# Patient Record
Sex: MALE | Race: WHITE | NOT HISPANIC OR LATINO | Employment: FULL TIME | URBAN - METROPOLITAN AREA
[De-identification: names, ages, dates, MRNs, and addresses within clinical notes are randomized per-mention and may not be internally consistent; named-entity substitution may affect disease eponyms.]

---

## 2023-06-09 ENCOUNTER — OCCUPATIONAL HEALTH (OUTPATIENT)
Dept: URGENT CARE | Facility: CLINIC | Age: 38
End: 2023-06-09
Payer: COMMERCIAL

## 2023-10-19 ENCOUNTER — HOSPITAL ENCOUNTER (OUTPATIENT)
Dept: RADIOLOGY | Facility: HOSPITAL | Age: 38
Discharge: HOME | End: 2023-10-19
Attending: STUDENT IN AN ORGANIZED HEALTH CARE EDUCATION/TRAINING PROGRAM
Payer: COMMERCIAL

## 2023-10-19 ENCOUNTER — OFFICE VISIT (OUTPATIENT)
Dept: PAIN MEDICINE | Facility: CLINIC | Age: 38
End: 2023-10-19
Payer: COMMERCIAL

## 2023-10-19 VITALS — HEART RATE: 85 BPM | SYSTOLIC BLOOD PRESSURE: 134 MMHG | DIASTOLIC BLOOD PRESSURE: 82 MMHG | HEIGHT: 72 IN

## 2023-10-19 DIAGNOSIS — M47.816 LUMBAR SPONDYLOSIS: ICD-10-CM

## 2023-10-19 DIAGNOSIS — M54.9 BACK PAIN, UNSPECIFIED BACK LOCATION, UNSPECIFIED BACK PAIN LATERALITY, UNSPECIFIED CHRONICITY: ICD-10-CM

## 2023-10-19 DIAGNOSIS — M54.9 BACK PAIN, UNSPECIFIED BACK LOCATION, UNSPECIFIED BACK PAIN LATERALITY, UNSPECIFIED CHRONICITY: Primary | ICD-10-CM

## 2023-10-19 PROCEDURE — 99203 PR OFFICE/OUTPT VISIT, NEW, LEVL III, 30-44 MIN: ICD-10-PCS | Mod: S$GLB,,, | Performed by: STUDENT IN AN ORGANIZED HEALTH CARE EDUCATION/TRAINING PROGRAM

## 2023-10-19 PROCEDURE — 1160F RVW MEDS BY RX/DR IN RCRD: CPT | Mod: CPTII,S$GLB,, | Performed by: STUDENT IN AN ORGANIZED HEALTH CARE EDUCATION/TRAINING PROGRAM

## 2023-10-19 PROCEDURE — 1160F PR REVIEW ALL MEDS BY PRESCRIBER/CLIN PHARMACIST DOCUMENTED: ICD-10-PCS | Mod: CPTII,S$GLB,, | Performed by: STUDENT IN AN ORGANIZED HEALTH CARE EDUCATION/TRAINING PROGRAM

## 2023-10-19 PROCEDURE — 99999 PR PBB SHADOW E&M-EST. PATIENT-LVL III: ICD-10-PCS | Mod: PBBFAC,,, | Performed by: STUDENT IN AN ORGANIZED HEALTH CARE EDUCATION/TRAINING PROGRAM

## 2023-10-19 PROCEDURE — 72100 X-RAY EXAM L-S SPINE 2/3 VWS: CPT | Mod: TC

## 2023-10-19 PROCEDURE — 1159F PR MEDICATION LIST DOCUMENTED IN MEDICAL RECORD: ICD-10-PCS | Mod: CPTII,S$GLB,, | Performed by: STUDENT IN AN ORGANIZED HEALTH CARE EDUCATION/TRAINING PROGRAM

## 2023-10-19 PROCEDURE — 3079F DIAST BP 80-89 MM HG: CPT | Mod: CPTII,S$GLB,, | Performed by: STUDENT IN AN ORGANIZED HEALTH CARE EDUCATION/TRAINING PROGRAM

## 2023-10-19 PROCEDURE — 3079F PR MOST RECENT DIASTOLIC BLOOD PRESSURE 80-89 MM HG: ICD-10-PCS | Mod: CPTII,S$GLB,, | Performed by: STUDENT IN AN ORGANIZED HEALTH CARE EDUCATION/TRAINING PROGRAM

## 2023-10-19 PROCEDURE — 99999 PR PBB SHADOW E&M-EST. PATIENT-LVL III: CPT | Mod: PBBFAC,,, | Performed by: STUDENT IN AN ORGANIZED HEALTH CARE EDUCATION/TRAINING PROGRAM

## 2023-10-19 PROCEDURE — 72100 X-RAY EXAM L-S SPINE 2/3 VWS: CPT | Mod: 26,,, | Performed by: RADIOLOGY

## 2023-10-19 PROCEDURE — 3075F PR MOST RECENT SYSTOLIC BLOOD PRESS GE 130-139MM HG: ICD-10-PCS | Mod: CPTII,S$GLB,, | Performed by: STUDENT IN AN ORGANIZED HEALTH CARE EDUCATION/TRAINING PROGRAM

## 2023-10-19 PROCEDURE — 99203 OFFICE O/P NEW LOW 30 MIN: CPT | Mod: S$GLB,,, | Performed by: STUDENT IN AN ORGANIZED HEALTH CARE EDUCATION/TRAINING PROGRAM

## 2023-10-19 PROCEDURE — 4010F ACE/ARB THERAPY RXD/TAKEN: CPT | Mod: CPTII,S$GLB,, | Performed by: STUDENT IN AN ORGANIZED HEALTH CARE EDUCATION/TRAINING PROGRAM

## 2023-10-19 PROCEDURE — 1159F MED LIST DOCD IN RCRD: CPT | Mod: CPTII,S$GLB,, | Performed by: STUDENT IN AN ORGANIZED HEALTH CARE EDUCATION/TRAINING PROGRAM

## 2023-10-19 PROCEDURE — 3075F SYST BP GE 130 - 139MM HG: CPT | Mod: CPTII,S$GLB,, | Performed by: STUDENT IN AN ORGANIZED HEALTH CARE EDUCATION/TRAINING PROGRAM

## 2023-10-19 PROCEDURE — 4010F PR ACE/ARB THEARPY RXD/TAKEN: ICD-10-PCS | Mod: CPTII,S$GLB,, | Performed by: STUDENT IN AN ORGANIZED HEALTH CARE EDUCATION/TRAINING PROGRAM

## 2023-10-19 PROCEDURE — 72100 XR LUMBAR SPINE AP AND LATERAL: ICD-10-PCS | Mod: 26,,, | Performed by: RADIOLOGY

## 2023-10-19 NOTE — PROGRESS NOTES
Office Note    No, Primary Doctor      First Office Visit: 10/19/23  Today' Date: 10/19/2023  Last Office Visit: None    Chief complaint: back pain    HPI: Pt is a pleasent 37 y.o., who presents for evaluation. Referred by self. Pt complains of back pain for years. Pain stays primarily in the lower back. In the past he has had sharp, shooting pain going down the leg to the top of the foot. He was seen at [facility] in the past and an outside pain clinic where he has had ESIs. Back pain is worse with sitting. Has not tried PT and is open to trying. No BB changes.     Pain score: 4  Pain disability score: 28      Relevant Imaging/ Testing: None    Procedures: None    Date of board of pharmacy review:10/19/2023  Date of opioid risk screening/ pain psych: None  Date of opioid agreement and consent: None  Date of urine drug screen: None  Date of random pill count: None     was reviewed today: reviewed, no concerns     Prescribed medications: None    See EHR for  PMH, PSH, FH, SH, Medications and Allergy    ROS:  Positive for pain  ROS     PE:  Vitals:    10/19/23 1248   BP: 134/82   Pulse: 85     General: Pleasant, no distress  HEENT: NC/ AT. PERRLA  CV: Radial pulses intact  Pulm: No distress  Ext: No edema    Physical Exam     Neuromusculoskeletal:  Head: NC, AT. PERRLA.  Neck: Intact range of motions  Shoulder: Intact range of motion  Lumbar: Intact range of motion. Pain with extension. Pos Facet loading bilaterally. Min Tenderness. Neg SL   Hip: Intact range of motion  SI: Level, min tenderness  Knee: Intact range of motion  Reflexes: normal Knee  Strength: 5/5 globally   Sensory: Grossly intact   Skin: No bruising, erythema  Gait: Normal      Impression:  Back pain  Axial back pain  Relevant History  None         Plan:  Discussed options  Imaging/ relevant records viewed/ reviewed/ discussed  Imaging results viewed and reviewed (noted above)/ reviewed with patient   reviewed  PT trial  XR L-spine  Re-eval after  MR  L-spine if pain persists  B MBB L4-5 and L5-S1 if pain persists        Prescribed medications:  1. None    The impression and plan were discussed and explained in detail. All the questions were answered. Education was provided accordingly.         Follow-up:  6 wks or sooner if needed    Gaviota Hoffman MD

## 2023-11-03 ENCOUNTER — CLINICAL SUPPORT (OUTPATIENT)
Dept: REHABILITATION | Facility: HOSPITAL | Age: 38
End: 2023-11-03
Attending: STUDENT IN AN ORGANIZED HEALTH CARE EDUCATION/TRAINING PROGRAM
Payer: COMMERCIAL

## 2023-11-03 DIAGNOSIS — M54.9 BACK PAIN, UNSPECIFIED BACK LOCATION, UNSPECIFIED BACK PAIN LATERALITY, UNSPECIFIED CHRONICITY: ICD-10-CM

## 2023-11-03 DIAGNOSIS — M47.816 LUMBAR SPONDYLOSIS: ICD-10-CM

## 2023-11-03 DIAGNOSIS — R29.898 WEAKNESS OF BOTH HIPS: ICD-10-CM

## 2023-11-03 DIAGNOSIS — M53.86 DECREASED ROM OF LUMBAR SPINE: ICD-10-CM

## 2023-11-03 PROCEDURE — 97161 PT EVAL LOW COMPLEX 20 MIN: CPT | Mod: PN

## 2023-11-03 PROCEDURE — 97530 THERAPEUTIC ACTIVITIES: CPT | Mod: PN

## 2023-11-05 NOTE — PLAN OF CARE
[facility]   Physical Therapy Initial Evaluation      Name: Cole Mendes  [facility] Number: [id number]    Therapy Diagnosis:   Encounter Diagnoses   Name Primary?    Back pain, unspecified back location, unspecified back pain laterality, unspecified chronicity     Lumbar spondylosis     Decreased ROM of lumbar spine     Weakness of both hips         Physician: Gaviota Hoffman, MD    Physician Orders: PT Eval and Treat   Medical Diagnosis from Referral:   M54.9 (ICD-10-CM) - Back pain, unspecified back location, unspecified back pain laterality, unspecified chronicity   M47.816 (ICD-10-CM) - Lumbar spondylosis     Evaluation Date: 11/3/2023  Authorization Period Expiration: 12/31/2023  Plan of Care Expiration: 12/29/2023  Progress Note Due: 12/04/2023  Date of Surgery: N/A  Visit # / Visits authorized: 1/ 1   FOTO: 1/ 3    Precautions: Standard     Time In: 10:08  Time Out: 11:01  Total Billable Time: 57 minutes    Subjective     Date of onset: 10 years at time; a couple weeks ago and he had ane xacerbation     History of current condition - [place] reports: He has had back pain for an extended period of time; while he was in [place] he was having pain and went to his MD and they were giving himinjections for an extended period of time; he eventually got an MRI and it was noted that he had [degenerative] changes and no disc bulges. He got a second opinion and the conclusion was the same. His insurance was giving him issues so he did not get therapy, but since moving to [place] 6 months ago he is prepared to start therapy. He mainly notices the pain with prolonged sitting and following any type of strenuous activities. His line of work makes things a little more strenuous for his low back.    Falls: none     Imaging: X-Ray: Lumbar vertebral bodies maintain normal height without evidence of fracture.  Grade 1 anterolisthesis of L4 on L5.  Moderate intervertebral disc space narrowing at L5-S1 and mild  disc space narrowing at L4-5.  Lower lumbar facet arthropathy, most pronounced at L4-5.  No focal soft tissue abnormality.    Prior Therapy: None   Social History:  lives with their spouse  Occupation:  for [employer]   Prior Level of Function: Indepedent with all task, active and is looking to get gym   Current Level of Function: indepdnet with all task and still active, just limited with pain     Pain:  Current 4/10, worst 9/10, best 3/10   Location: bilateral Lumbosacral junction    Description: Aching, Throbbing, and stabbing  Aggravating Factors: Sitting, Morning, and Extension  Easing Factors: rest and heating pad    Patients goals: I would like to be able to use the physical therapy to find a method to strengthen my muscles group so that I don't need to take antiinflammatory      Medical History:   No past medical history on file.    Surgical History:   Cole Mendes  has no past surgical history on file.    Medications:   Cole currently has no medications in their medication list.    Allergies:   Review of patient's allergies indicates:  No Known Allergies     Objective      Observation: excess lordosis, hinging from Lumbosacral joint and TL junction    Posture:  bilateral hip drop, increased rotation from trunk, limited hip extension     Dermatomes Right Left Comments   L2 Intact Intact     L3 Intact Intact     L4 Intact Intact     L5 Intact Intact     S1 Intact Intact     S2 Intact Intact           Myotomes Right Left Comments   L2 5/5 5/5     L3 5/5 5/5     L4 5/5 5/5     L5 5/5 5/5     S1 5/5 5/5     S2 5/5 5/5       Lumbar Range of Motion:    Limitations Pain Normal   Flexion 50   Y      40-50 degrees   Extension 50   Y      20 degrees   Left Side Bending 50 Y      20 degrees   Right Side Bending 50 Y      20 degrees    Almost 75% limited: no movement from lumbar spine     Hip Passive Range of Motion:    Right  Left  Normal   Flexion 95 95 120 degrees    Extension 20 20 20 degrees    Ext.  Rotation 45 45 45-60 degrees   Int. Rotation 35 35 35 degrees        Lower Extremity Strength  Right LE  Left LE    Quadriceps: 5/5 Quadriceps: 5/5   Hamstrings: 5/5 Hamstrings: 5/5   Iliospoas: 4+/5 Iliospoas: 4+/5   Hip extension:  3+/5 Hip extension: 3+/5   Hip abduction: 4-/5 Hip abduction: 4-/5   Hip ER:  4+/5 Hip ER: 4+/5   Hip IR: 4/5 Hip IR: 4/5   Ankle dorsiflexion: 5/5 Ankle dorsiflexion: 5/5   Ankle plantarflexion: 5/5 Ankle plantarflexion: 5/5     Sensation: intact to light touch in BLE     Reflexes:  -Patellar (L3-L4): 2+  -Achilles (S1): 2+    Special Tests:  -SLUMP Test: inconclusive   -SLR Test: inconclusive       SI Special Tests:   Distraction: -  Compression: -  Sacral thrust: -    Joint Mobility: Decreased Lumbar (L1-L5) mobility noted with PA assessment     Palpation: No tenderness to palpation noted in the thoracic or lumbar spine               Treatment     Total Treatment time (time-based codes) separate from Evaluation: 15 minutes     Cole received the treatments listed below:      therapeutic activities to improve functional performance for 15  minutes, including:  HEP education:  Hand heel rocks x15, 3 second hold   Clamshells x5, with red theraband, 5 second hold   Deadbugs x10    POC education        Patient Education and Home Exercises     Education provided:   - HEP education  - POC education    Written Home Exercises Provided: yes. Exercises were reviewed and Cole was able to demonstrate them prior to the end of the session.  Cole demonstrated good  understanding of the education provided. See EMR under Patient Instructions for exercises provided during therapy sessions.    Assessment     Cole is a 38 y.o. male referred to outpatient Physical Therapy with a medical diagnosis of M54.9 (ICD-10-CM) - Back pain, unspecified back location, unspecified back pain laterality, unspecified chronicity and M47.816 (ICD-10-CM) - Lumbar spondylosis. Patient presents with signs and  symptoms consistent with lumbar facet syndrome and chronic low back pain with mobility deficits. Joseph has decreased lumbar spine range of motion and excess lumbar lordosis; this is leading him to hinge movements from his thoracolumbar junction and lumbosacral junction. In addition Cole has global hip weakness; his lack of lumbar mobility and decrease hip stability is causing abnormal loading patterns and increase load to be placed through his lumbar spine. These deficits are causing deficits with his ADLs, self care, work related task and ability to participate in his leisurely activities. He makes a good candidate for skilled Physical Therapy to improve the above listed deficits.     Patient prognosis is Good.   Patient will benefit from skilled outpatient Physical Therapy to address the deficits stated above and in the chart below, provide patient /family education, and to maximize patientt's level of independence.     Plan of care discussed with patient: Yes  Patient's spiritual, cultural and educational needs considered and patient is agreeable to the plan of care and goals as stated below:     Anticipated Barriers for therapy: none noted    Medical Necessity is demonstrated by the following  History  Co-morbidities and personal factors that may impact the plan of care [x] LOW: no personal factors / co-morbidities  [] MODERATE: 1-2 personal factors / co-morbidities  [] HIGH: 3+ personal factors / co-morbidities    Moderate / High Support Documentation:   Co-morbidities affecting plan of care:     Personal Factors:   no deficits     Examination  Body Structures and Functions, activity limitations and participation restrictions that may impact the plan of care [x] LOW: addressing 1-2 elements  [] MODERATE: 3+ elements  [] HIGH: 4+ elements (please support below)    Moderate / High Support Documentation:      Clinical Presentation [x] LOW: stable  [] MODERATE: Evolving  [] HIGH: Unstable     Decision Making/  Complexity Score: low       Goals:  Short Term Goals: 4 weeks. Pt agrees with goals set.  Pt will demonstrate independence and compliance with initial HEP to improve independence and symptom management.   Pt will report lumbar spine pain </= 3/10 at rest with sitting, standing and laying down to demonstrate improved condition and ability to participate in his leisurely activities.    Pt will improve MMT of hip extensors to >/= 4-/5 to improve tolerance for standing, bending and lifting; to also decrease the demand placed on his lumbar spine.    Pt will improve lumbar ROM by >= 25 % to improve tolerance for bending and lifting to improve ability to complete ADLs and work related task.      Long Term Goals: 8 weeks. Pt agrees with goals set.   Pt will demonstrate independence and compliance with final HEP to continue managing symptoms and developing mobility, motor control and strength.    Pt will improve FOTO score to </= 33% limited to demonstrate improved functional mobility.    Pt will report lumbar spine pain </= 2/10 on exertion  with bending and lifting to demonstrate improved condition and ability to complete work related task and participate in higher level activities.    Pt will improve MMT of hip abductors and hip extensors to >/= 4+/5 to improve hip stability and decrease the demand placed on the lumbar spine; improving his ability to lift objects at work.    Pt will improve lumbar ROM by >/= 50% to improve tolerance for flexion, extension, and side bending to improve his ability to complete lifting at work and facilitate a return to his ADLs and leisurely activities.    Pt goal:  I would like to be able to use the physical therapy to find a method to strengthen my muscles group so that I don't need to take antiinflammatory    Plan     Plan of care Certification: 11/3/2023 to 12/31/2023.    Outpatient Physical Therapy 2 times weekly for 8 weeks to include the following interventions: Cervical/Lumbar  Traction, Electrical Stimulation NMES, Gait Training, Manual Therapy, Moist Heat/ Ice, Neuromuscular Re-ed, Patient Education, Self Care, Therapeutic Activities, and Therapeutic Exercise.     Jared Quintana PT, DPT        Physician's Signature: _________________________________________ Date: ________________

## 2023-11-09 ENCOUNTER — CLINICAL SUPPORT (OUTPATIENT)
Dept: REHABILITATION | Facility: HOSPITAL | Age: 38
End: 2023-11-09
Payer: COMMERCIAL

## 2023-11-09 DIAGNOSIS — M53.86 DECREASED ROM OF LUMBAR SPINE: Primary | ICD-10-CM

## 2023-11-09 DIAGNOSIS — R29.898 WEAKNESS OF BOTH HIPS: ICD-10-CM

## 2023-11-09 PROCEDURE — 97112 NEUROMUSCULAR REEDUCATION: CPT | Mod: PN

## 2023-11-09 PROCEDURE — 97110 THERAPEUTIC EXERCISES: CPT | Mod: PN

## 2023-11-09 PROCEDURE — 97140 MANUAL THERAPY 1/> REGIONS: CPT | Mod: PN

## 2023-11-09 NOTE — PROGRESS NOTES
[facility]ER[facility] AND [facility]   Physical Therapy Treatment Note     Name: Cole Mendes  Clinic Number: [id number]    Therapy Diagnosis:   Encounter Diagnoses   Name Primary?    Decreased ROM of lumbar spine Yes    Weakness of both hips      Physician: Gaviota Hoffman MD    Visit Date: 11/9/2023    Evaluation Date: 11/3/2023  Authorization Period Expiration: 12/31/2023  Plan of Care Expiration: 12/29/2023  Progress Note Due: 12/04/2023  Date of Surgery: N/A  Visit # / Visits authorized: 1/ 25 (+eval)  FOTO: 1/ 3     Precautions: Standard     PTA Visit #: 0/5     FOTO first follow up:   FOTO second follow up:     Time In: 2:00  Time Out: 3:00  Total Billable Time: 60 minutes    SUBJECTIVE     Pt reports: He had a rough day at work (16 hours) so his back is pretty flared up at this time.  He was compliant with home exercise program.  Response to previous treatment: none noted  Functional change: as above    Pain: 5/10  Location: bilateral generalized low back      OBJECTIVE     Objective Measures updated at progress report unless specified.     Treatment     Cole received the treatments listed below:      therapeutic exercises to develop strength, endurance, ROM, and flexibility for 10 minutes including:  Foam roll x3 minutes  Hand heel rocks x20, 5 second hold    manual therapy techniques: Joint mobilizations were applied to the: Lumbar spine for 8 minutes, including:  Lumbar gapping mobilization grade III-IV    neuromuscular re-education activities to improve: Balance, Coordination, Kinesthetic, Sense, and Proprioception for 42 minutes. The following activities were included:  Lateral walking with red theraband x3, 10 yds down and back  Dead bugs with red theraball, x30  Posterior pelvic tilt, x10, 5 second hold with abdominal bracing  Paloff press with posterior pelvic tilt and abdominal bracing, x15 bilaterally, 5 second hold     therapeutic activities to improve functional performance for 00  minutes,  including:    Patient Education and Home Exercises     Home Exercises Provided and Patient Education Provided     Education provided:   - HEP education  - POC education  - Activity modification at work    Written Home Exercises Provided: Patient instructed to cont prior HEP. Exercises were reviewed and Cole was able to demonstrate them prior to the end of the session.  Cole demonstrated fair  understanding of the education provided. See EMR under Patient Instructions for exercises provided during therapy sessions    ASSESSMENT     Cole completed therapy with  no exacerbation of his symptoms. Therapy focused on improving his spinal mobility and core activation. He currently sits in excessive lumbar lordosis and this is in part due to his prolonged lumbar extension at work. Therapy is looking to improve his paraspinal mobility and core stability. Therapy will look to progress as neccessary.     Cole Is progressing well towards his goals.   Pt prognosis is Fair.     Pt will continue to benefit from skilled outpatient physical therapy to address the deficits listed in the problem list box on initial evaluation, provide pt/family education and to maximize pt's level of independence in the home and community environment.     Pt's spiritual, cultural and educational needs considered and pt agreeable to plan of care and goals.     Anticipated barriers to physical therapy: none noted at this time    Goals:  Short Term Goals: 4 weeks. Pt agrees with goals set.  Pt will demonstrate independence and compliance with initial HEP to improve independence and symptom management.   Pt will report lumbar spine pain </= 3/10 at rest with sitting, standing and laying down to demonstrate improved condition and ability to participate in his leisurely activities.    Pt will improve MMT of hip extensors to >/= 4-/5 to improve tolerance for standing, bending and lifting; to also decrease the demand placed on his lumbar spine.     Pt will improve lumbar ROM by >= 25 % to improve tolerance for bending and lifting to improve ability to complete ADLs and work related task.       Long Term Goals: 8 weeks. Pt agrees with goals set.   Pt will demonstrate independence and compliance with final HEP to continue managing symptoms and developing mobility, motor control and strength.    Pt will improve FOTO score to </= 33% limited to demonstrate improved functional mobility.    Pt will report lumbar spine pain </= 2/10 on exertion  with bending and lifting to demonstrate improved condition and ability to complete work related task and participate in higher level activities.    Pt will improve MMT of hip abductors and hip extensors to >/= 4+/5 to improve hip stability and decrease the demand placed on the lumbar spine; improving his ability to lift objects at work.    Pt will improve lumbar ROM by >/= 50% to improve tolerance for flexion, extension, and side bending to improve his ability to complete lifting at work and facilitate a return to his ADLs and leisurely activities.    Pt goal:  I would like to be able to use the physical therapy to find a method to strengthen my muscles group so that I don't need to take antiinflammatory      PLAN     Develop lumbar mobility and increase core strength    Jared Quintana, PT, DPT

## 2023-11-17 ENCOUNTER — CLINICAL SUPPORT (OUTPATIENT)
Dept: REHABILITATION | Facility: HOSPITAL | Age: 38
End: 2023-11-17
Payer: COMMERCIAL

## 2023-11-17 DIAGNOSIS — R29.898 WEAKNESS OF BOTH HIPS: ICD-10-CM

## 2023-11-17 DIAGNOSIS — M53.86 DECREASED ROM OF LUMBAR SPINE: Primary | ICD-10-CM

## 2023-11-17 PROCEDURE — 97110 THERAPEUTIC EXERCISES: CPT | Mod: PN

## 2023-11-17 PROCEDURE — 97112 NEUROMUSCULAR REEDUCATION: CPT | Mod: PN

## 2023-11-17 PROCEDURE — 97140 MANUAL THERAPY 1/> REGIONS: CPT | Mod: PN

## 2023-11-17 NOTE — PROGRESS NOTES
[facility]   Physical Therapy Treatment Note     Name: Cole Mendes  Clinic Number: [id number]    Therapy Diagnosis:   Encounter Diagnoses   Name Primary?    Decreased ROM of lumbar spine Yes    Weakness of both hips      Physician: Gaviota Hoffman MD    Visit Date: 11/17/2023    Evaluation Date: 11/3/2023  Authorization Period Expiration: 12/31/2023  Plan of Care Expiration: 12/29/2023  Progress Note Due: 12/04/2023  Date of Surgery: N/A  Visit # / Visits authorized: 2/ 25 (+eval)  FOTO: 1/ 3     Precautions: Standard     PTA Visit #: 0/5     FOTO first follow up:   FOTO second follow up:     Time In: 10:03  Time Out: 11:00  Total Billable Time: 57 minutes    SUBJECTIVE     Pt reports: He doing well, he is more aware of keeping his core active and trying to get out of a posterior pelvic tilt.  He was compliant with home exercise program.  Response to previous treatment: none noted  Functional change: as above    Pain: 5/10  Location: bilateral generalized low back      OBJECTIVE     Objective Measures updated at progress report unless specified.     Treatment     Cole received the treatments listed below:      therapeutic exercises to develop strength, endurance, ROM, and flexibility for 9 minutes including:  Foam roll x3 minutes  Hand heel rocks x20, 5 second hold    manual therapy techniques: Joint mobilizations were applied to the: Lumbar spine for 8 minutes, including:  Lumbar gapping mobilization grade III-IV    neuromuscular re-education activities to improve: Balance, Coordination, Kinesthetic, Sense, and Proprioception for 40 minutes. The following activities were included:  Lateral walking with red theraband x3, 10 yds down and back  Dead bugs with red theraball, x30  Posterior pelvic tilt, x10, 5 second hold with abdominal bracing  Paloff press with posterior pelvic tilt and abdominal bracing and rotation, x20 bilaterally, 5 second hold   Step ups contralateral step up with  20#, x15    therapeutic activities to improve functional performance for 00  minutes, including:    Patient Education and Home Exercises     Home Exercises Provided and Patient Education Provided     Education provided:   - HEP education  - POC education  - Activity modification at work    Written Home Exercises Provided: Patient instructed to cont prior HEP. Exercises were reviewed and Cole was able to demonstrate them prior to the end of the session.  Cole demonstrated fair  understanding of the education provided. See EMR under Patient Instructions for exercises provided during therapy sessions    ASSESSMENT     Cole is doing well at this time; He does continue to report instances of back pain, but he knows that his issue is chronic and that it will take time. Therapy has been focusing on developing his motor control and improving his functional strength. Cole is looking to join a gym soon; therapy will progress as necessary and educate him on proper use of gym equipment that will improve his symptoms.     Cole Is progressing well towards his goals.   Pt prognosis is Fair.     Pt will continue to benefit from skilled outpatient physical therapy to address the deficits listed in the problem list box on initial evaluation, provide pt/family education and to maximize pt's level of independence in the home and community environment.     Pt's spiritual, cultural and educational needs considered and pt agreeable to plan of care and goals.     Anticipated barriers to physical therapy: none noted at this time    Goals:  Short Term Goals: 4 weeks. Pt agrees with goals set.  Pt will demonstrate independence and compliance with initial HEP to improve independence and symptom management.   Pt will report lumbar spine pain </= 3/10 at rest with sitting, standing and laying down to demonstrate improved condition and ability to participate in his leisurely activities.    Pt will improve MMT of hip extensors to  >/= 4-/5 to improve tolerance for standing, bending and lifting; to also decrease the demand placed on his lumbar spine.    Pt will improve lumbar ROM by >= 25 % to improve tolerance for bending and lifting to improve ability to complete ADLs and work related task.       Long Term Goals: 8 weeks. Pt agrees with goals set.   Pt will demonstrate independence and compliance with final HEP to continue managing symptoms and developing mobility, motor control and strength.    Pt will improve FOTO score to </= 33% limited to demonstrate improved functional mobility.    Pt will report lumbar spine pain </= 2/10 on exertion  with bending and lifting to demonstrate improved condition and ability to complete work related task and participate in higher level activities.    Pt will improve MMT of hip abductors and hip extensors to >/= 4+/5 to improve hip stability and decrease the demand placed on the lumbar spine; improving his ability to lift objects at work.    Pt will improve lumbar ROM by >/= 50% to improve tolerance for flexion, extension, and side bending to improve his ability to complete lifting at work and facilitate a return to his ADLs and leisurely activities.    Pt goal:  I would like to be able to use the physical therapy to find a method to strengthen my muscles group so that I don't need to take antiinflammatory      PLAN     Develop lumbar mobility and increase core strength    Jared Quintana, PT, DPT

## 2023-11-20 ENCOUNTER — CLINICAL SUPPORT (OUTPATIENT)
Dept: REHABILITATION | Facility: HOSPITAL | Age: 38
End: 2023-11-20
Payer: COMMERCIAL

## 2023-11-20 DIAGNOSIS — M53.86 DECREASED ROM OF LUMBAR SPINE: Primary | ICD-10-CM

## 2023-11-20 DIAGNOSIS — R29.898 WEAKNESS OF BOTH HIPS: ICD-10-CM

## 2023-11-20 PROCEDURE — 97112 NEUROMUSCULAR REEDUCATION: CPT | Mod: PN,CQ

## 2023-11-20 PROCEDURE — 97140 MANUAL THERAPY 1/> REGIONS: CPT | Mod: PN,CQ

## 2023-11-20 PROCEDURE — 97110 THERAPEUTIC EXERCISES: CPT | Mod: PN,CQ

## 2023-11-20 NOTE — PROGRESS NOTES
[facility]   Physical Therapy Treatment Note     Name: Cole Mendes  Clinic Number: [id number]    Therapy Diagnosis:   Encounter Diagnoses   Name Primary?    Decreased ROM of lumbar spine Yes    Weakness of both hips      Physician: Gaviota Hoffman MD    Visit Date: 11/20/2023    Evaluation Date: 11/3/2023  Authorization Period Expiration: 12/31/2023  Plan of Care Expiration: 12/29/2023  Progress Note Due: 12/04/2023  Date of Surgery: N/A  Visit # / Visits authorized: 3/ 25 (+eval)  FOTO: 1/ 3     Precautions: Standard     PTA Visit #: 1/5     FOTO first follow up:   FOTO second follow up:     Time In: 10:30  Time Out: 11:30  Total Billable Time: 57 minutes    SUBJECTIVE     Pt reports: Does better in the morning and as his days progresses so does his pain. Finds the days he has more pain are the days he has to go up and down ladders more.    He was compliant with home exercise program.  Response to previous treatment: none noted  Functional change: as above    Pain: 5/10  Location: bilateral generalized low back      OBJECTIVE     Objective Measures updated at progress report unless specified.     Treatment     Cole received the treatments listed below:      therapeutic exercises to develop strength, endurance, ROM, and flexibility for 9 minutes including:  Foam roll x3 minutes  Hand heel rocks x20, 5 second hold with black band to promote inferior/posterior glide    manual therapy techniques: Joint mobilizations were applied to the: Lumbar spine for 8 minutes, including:  Lumbar gapping mobilization grade III-IV  Long axis distraction grade IV  Caudal glides grade IV    neuromuscular re-education activities to improve: Balance, Coordination, Kinesthetic, Sense, and Proprioception for 40 minutes. The following activities were included:  Lateral walking with red theraband x3, 10 yds down and back  Dead bugs with red theraball, x30  Bent knee fall out with red theraband 3 x 10 repetitions    Single leg bridges 3 x 10 repetitions   Posterior pelvic tilt, x10, 5 second hold with abdominal bracing  Paloff press with posterior pelvic tilt and abdominal bracing and rotation, x20 bilaterally, 5 second hold   Step ups contralateral pull down with blue theraband , 2 x 10    therapeutic activities to improve functional performance for 00  minutes, including:    Patient Education and Home Exercises     Home Exercises Provided and Patient Education Provided     Education provided:   - HEP education  - POC education  - Activity modification at work    Written Home Exercises Provided: Patient instructed to cont prior HEP. Exercises were reviewed and Cole was able to demonstrate them prior to the end of the session.  Cole demonstrated fair  understanding of the education provided. See EMR under Patient Instructions for exercises provided during therapy sessions    ASSESSMENT     Due to subjective reporting, incorporated exercises to improve hip mobility and more glute specific work to improve strength and his ability to climb ladders without increase in low back pain. Cole verbalized good response to all therapeutic interventions.  Will continue to progress as able.     Cole Is progressing well towards his goals.   Pt prognosis is Fair.     Pt will continue to benefit from skilled outpatient physical therapy to address the deficits listed in the problem list box on initial evaluation, provide pt/family education and to maximize pt's level of independence in the home and community environment.     Pt's spiritual, cultural and educational needs considered and pt agreeable to plan of care and goals.     Anticipated barriers to physical therapy: none noted at this time    Goals:  Short Term Goals: 4 weeks. Pt agrees with goals set.  Pt will demonstrate independence and compliance with initial HEP to improve independence and symptom management.   Pt will report lumbar spine pain </= 3/10 at rest with  sitting, standing and laying down to demonstrate improved condition and ability to participate in his leisurely activities.    Pt will improve MMT of hip extensors to >/= 4-/5 to improve tolerance for standing, bending and lifting; to also decrease the demand placed on his lumbar spine.    Pt will improve lumbar ROM by >= 25 % to improve tolerance for bending and lifting to improve ability to complete ADLs and work related task.       Long Term Goals: 8 weeks. Pt agrees with goals set.   Pt will demonstrate independence and compliance with final HEP to continue managing symptoms and developing mobility, motor control and strength.    Pt will improve FOTO score to </= 33% limited to demonstrate improved functional mobility.    Pt will report lumbar spine pain </= 2/10 on exertion  with bending and lifting to demonstrate improved condition and ability to complete work related task and participate in higher level activities.    Pt will improve MMT of hip abductors and hip extensors to >/= 4+/5 to improve hip stability and decrease the demand placed on the lumbar spine; improving his ability to lift objects at work.    Pt will improve lumbar ROM by >/= 50% to improve tolerance for flexion, extension, and side bending to improve his ability to complete lifting at work and facilitate a return to his ADLs and leisurely activities.    Pt goal:  I would like to be able to use the physical therapy to find a method to strengthen my muscles group so that I don't need to take antiinflammatory      PLAN     Develop lumbar mobility and increase core strength    Tania Turcios, PTA

## 2023-11-30 ENCOUNTER — OFFICE VISIT (OUTPATIENT)
Dept: PAIN MEDICINE | Facility: CLINIC | Age: 38
End: 2023-11-30
Payer: COMMERCIAL

## 2023-11-30 ENCOUNTER — TELEPHONE (OUTPATIENT)
Dept: PAIN MEDICINE | Facility: CLINIC | Age: 38
End: 2023-11-30

## 2023-11-30 VITALS — HEIGHT: 72 IN | DIASTOLIC BLOOD PRESSURE: 87 MMHG | HEART RATE: 74 BPM | SYSTOLIC BLOOD PRESSURE: 145 MMHG

## 2023-11-30 DIAGNOSIS — M47.817 SPONDYLOSIS OF LUMBOSACRAL REGION WITHOUT MYELOPATHY OR RADICULOPATHY: ICD-10-CM

## 2023-11-30 DIAGNOSIS — M47.816 LUMBAR SPONDYLOSIS: Primary | ICD-10-CM

## 2023-11-30 DIAGNOSIS — M54.9 DORSALGIA, UNSPECIFIED: Primary | ICD-10-CM

## 2023-11-30 DIAGNOSIS — M43.17 SPONDYLOLISTHESIS AT L5-S1 LEVEL: ICD-10-CM

## 2023-11-30 DIAGNOSIS — M54.9 BACK PAIN, UNSPECIFIED BACK LOCATION, UNSPECIFIED BACK PAIN LATERALITY, UNSPECIFIED CHRONICITY: ICD-10-CM

## 2023-11-30 PROCEDURE — 1159F PR MEDICATION LIST DOCUMENTED IN MEDICAL RECORD: ICD-10-PCS | Mod: CPTII,S$GLB,, | Performed by: STUDENT IN AN ORGANIZED HEALTH CARE EDUCATION/TRAINING PROGRAM

## 2023-11-30 PROCEDURE — 4010F PR ACE/ARB THEARPY RXD/TAKEN: ICD-10-PCS | Mod: CPTII,S$GLB,, | Performed by: STUDENT IN AN ORGANIZED HEALTH CARE EDUCATION/TRAINING PROGRAM

## 2023-11-30 PROCEDURE — 99999 PR PBB SHADOW E&M-EST. PATIENT-LVL III: ICD-10-PCS | Mod: PBBFAC,,, | Performed by: STUDENT IN AN ORGANIZED HEALTH CARE EDUCATION/TRAINING PROGRAM

## 2023-11-30 PROCEDURE — 3077F PR MOST RECENT SYSTOLIC BLOOD PRESSURE >= 140 MM HG: ICD-10-PCS | Mod: CPTII,S$GLB,, | Performed by: STUDENT IN AN ORGANIZED HEALTH CARE EDUCATION/TRAINING PROGRAM

## 2023-11-30 PROCEDURE — 3079F DIAST BP 80-89 MM HG: CPT | Mod: CPTII,S$GLB,, | Performed by: STUDENT IN AN ORGANIZED HEALTH CARE EDUCATION/TRAINING PROGRAM

## 2023-11-30 PROCEDURE — 1160F PR REVIEW ALL MEDS BY PRESCRIBER/CLIN PHARMACIST DOCUMENTED: ICD-10-PCS | Mod: CPTII,S$GLB,, | Performed by: STUDENT IN AN ORGANIZED HEALTH CARE EDUCATION/TRAINING PROGRAM

## 2023-11-30 PROCEDURE — 3077F SYST BP >= 140 MM HG: CPT | Mod: CPTII,S$GLB,, | Performed by: STUDENT IN AN ORGANIZED HEALTH CARE EDUCATION/TRAINING PROGRAM

## 2023-11-30 PROCEDURE — 99999 PR PBB SHADOW E&M-EST. PATIENT-LVL III: CPT | Mod: PBBFAC,,, | Performed by: STUDENT IN AN ORGANIZED HEALTH CARE EDUCATION/TRAINING PROGRAM

## 2023-11-30 PROCEDURE — 99214 PR OFFICE/OUTPT VISIT, EST, LEVL IV, 30-39 MIN: ICD-10-PCS | Mod: S$GLB,,, | Performed by: STUDENT IN AN ORGANIZED HEALTH CARE EDUCATION/TRAINING PROGRAM

## 2023-11-30 PROCEDURE — 1159F MED LIST DOCD IN RCRD: CPT | Mod: CPTII,S$GLB,, | Performed by: STUDENT IN AN ORGANIZED HEALTH CARE EDUCATION/TRAINING PROGRAM

## 2023-11-30 PROCEDURE — 4010F ACE/ARB THERAPY RXD/TAKEN: CPT | Mod: CPTII,S$GLB,, | Performed by: STUDENT IN AN ORGANIZED HEALTH CARE EDUCATION/TRAINING PROGRAM

## 2023-11-30 PROCEDURE — 3079F PR MOST RECENT DIASTOLIC BLOOD PRESSURE 80-89 MM HG: ICD-10-PCS | Mod: CPTII,S$GLB,, | Performed by: STUDENT IN AN ORGANIZED HEALTH CARE EDUCATION/TRAINING PROGRAM

## 2023-11-30 PROCEDURE — 1160F RVW MEDS BY RX/DR IN RCRD: CPT | Mod: CPTII,S$GLB,, | Performed by: STUDENT IN AN ORGANIZED HEALTH CARE EDUCATION/TRAINING PROGRAM

## 2023-11-30 PROCEDURE — 99214 OFFICE O/P EST MOD 30 MIN: CPT | Mod: S$GLB,,, | Performed by: STUDENT IN AN ORGANIZED HEALTH CARE EDUCATION/TRAINING PROGRAM

## 2023-11-30 NOTE — H&P (VIEW-ONLY)
Office Note    No, Primary Doctor      First Office Visit: 10/19/23  Today' Date: 11/30/2023  Last Office Visit: 10/19/23    Chief complaint: back pain    HPI: Pt is a pleasent 38 y.o., who presents for evaluation. Referred by self. Pt complains of back pain for years. Pain stays primarily in the lower back. In the past he has had sharp, shooting pain going down the leg to the top of the foot. He was seen at [facility] in the past and an outside pain clinic where he has had ESIs. Back pain is worse with sitting. Has not tried PT and is open to trying. No BB changes.     Pain score: 4  Pain disability score: 28      Relevant Imaging/ Testing:   XR L-spine 10/23    Procedures: None    Date of board of pharmacy review:11/30/2023  Date of opioid risk screening/ pain psych: None  Date of opioid agreement and consent: None  Date of urine drug screen: None  Date of random pill count: None     was reviewed today: reviewed, no concerns     Prescribed medications: None    See EHR for  PMH, PSH, FH, SH, Medications and Allergy    ROS:  Positive for pain  ROS     PE:  There were no vitals filed for this visit.    General: Pleasant, no distress  HEENT: NC/ AT. PERRLA  CV: Radial pulses intact  Pulm: No distress  Ext: No edema    Physical Exam     Neuromusculoskeletal:  Head: NC, AT. PERRLA.  Neck: Intact range of motions  Shoulder: Intact range of motion  Lumbar: Intact range of motion. Pain with extension. Pos Facet loading bilaterally. Min Tenderness. Neg SL   Hip: Intact range of motion  SI: Level, min tenderness  Knee: Intact range of motion  Reflexes: normal Knee  Strength: 5/5 globally   Sensory: Grossly intact   Skin: No bruising, erythema  Gait: Normal      Impression:  Back pain  Axial back pain  Relevant History  None         Plan:  Discussed options  Imaging/ relevant records viewed/ reviewed/ discussed  Imaging results viewed and reviewed (noted above)/ reviewed with patient   reviewed  PT trial  MR L-spine if pain  persists  B MBB L4-5 and L5-S1  Re-eval after  Repeat MBB followed by RFA if appropriate       Prescribed medications:  1. None    The impression and plan were discussed and explained in detail. All the questions were answered. Education was provided accordingly.     The procedure was explained in detail, along with risks and potential side effects.    Follow-up:  For procedure     Gaviota Hoffman MD

## 2023-11-30 NOTE — PROGRESS NOTES
Office Note    No, Primary Doctor      First Office Visit: 10/19/23  Today' Date: 11/30/2023  Last Office Visit: 10/19/23    Chief complaint: back pain    HPI: Pt is a pleasent 38 y.o., who presents for evaluation. Referred by self. Pt complains of back pain for years. Pain stays primarily in the lower back. In the past he has had sharp, shooting pain going down the leg to the top of the foot. He was seen at [facility] in the past and an outside pain clinic where he has had ESIs. Back pain is worse with sitting. Has not tried PT and is open to trying. No BB changes.     Pain score: 4  Pain disability score: 28      Relevant Imaging/ Testing:   XR L-spine 10/23    Procedures: None    Date of board of pharmacy review:11/30/2023  Date of opioid risk screening/ pain psych: None  Date of opioid agreement and consent: None  Date of urine drug screen: None  Date of random pill count: None     was reviewed today: reviewed, no concerns     Prescribed medications: None    See EHR for  PMH, PSH, FH, SH, Medications and Allergy    ROS:  Positive for pain  ROS     PE:  There were no vitals filed for this visit.    General: Pleasant, no distress  HEENT: NC/ AT. PERRLA  CV: Radial pulses intact  Pulm: No distress  Ext: No edema    Physical Exam     Neuromusculoskeletal:  Head: NC, AT. PERRLA.  Neck: Intact range of motions  Shoulder: Intact range of motion  Lumbar: Intact range of motion. Pain with extension. Pos Facet loading bilaterally. Min Tenderness. Neg SL   Hip: Intact range of motion  SI: Level, min tenderness  Knee: Intact range of motion  Reflexes: normal Knee  Strength: 5/5 globally   Sensory: Grossly intact   Skin: No bruising, erythema  Gait: Normal      Impression:  Back pain  Axial back pain  Relevant History  None         Plan:  Discussed options  Imaging/ relevant records viewed/ reviewed/ discussed  Imaging results viewed and reviewed (noted above)/ reviewed with patient   reviewed  PT trial  MR L-spine if pain  persists  B MBB L4-5 and L5-S1  Re-eval after  Repeat MBB followed by RFA if appropriate       Prescribed medications:  1. None    The impression and plan were discussed and explained in detail. All the questions were answered. Education was provided accordingly.     The procedure was explained in detail, along with risks and potential side effects.    Follow-up:  For procedure     Gaviota Hoffman MD

## 2023-11-30 NOTE — TELEPHONE ENCOUNTER
Types of orders made on 11/30/2023: Imaging, Procedure Request      Order Date:11/30/2023   Ordering User:VILLA PAIGE [[id number]]   Encounter Provider:Villa Paige MD [[id number]]   Authorizing Provider: Villa Paige MD [[id number]]   Department:[facility][[id number]]      Common Order Information   Procedure -> Medial Branch Block (Specify level and laterality) Cmt: Bilateral             L4-5 and L5-S1      Order Specific Information   Order: Procedure Order to Pain Management [Custom: [id number]]  Order #:          [id number]: 1 FUTURE     Priority: Routine  Class: Clinic Performed     Future Order Information          Expires on:11/30/2024            Expected by:11/30/2023                   Associated Diagnoses       M47.817 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

## 2023-12-01 ENCOUNTER — CLINICAL SUPPORT (OUTPATIENT)
Dept: REHABILITATION | Facility: HOSPITAL | Age: 38
End: 2023-12-01
Payer: COMMERCIAL

## 2023-12-01 DIAGNOSIS — M53.86 DECREASED ROM OF LUMBAR SPINE: Primary | ICD-10-CM

## 2023-12-01 DIAGNOSIS — R29.898 WEAKNESS OF BOTH HIPS: ICD-10-CM

## 2023-12-01 PROCEDURE — 97140 MANUAL THERAPY 1/> REGIONS: CPT | Mod: PN

## 2023-12-01 PROCEDURE — 97112 NEUROMUSCULAR REEDUCATION: CPT | Mod: PN

## 2023-12-01 PROCEDURE — 97110 THERAPEUTIC EXERCISES: CPT | Mod: PN

## 2023-12-01 NOTE — PROGRESS NOTES
[facility]   Physical Therapy Treatment Note     Name: Cole Mendes  Clinic Number: [id number]    Therapy Diagnosis:   Encounter Diagnoses   Name Primary?    Decreased ROM of lumbar spine Yes    Weakness of both hips      Physician: Gaviota Hoffman MD    Visit Date: 12/1/2023    Evaluation Date: 11/3/2023  Authorization Period Expiration: 12/31/2023  Plan of Care Expiration: 12/29/2023  Progress Note Due: 12/04/2023  Date of Surgery: N/A  Visit # / Visits authorized: 4/ 25 (+eval)  FOTO: 1/ 3     Precautions: Standard     PTA Visit #: 0/5     FOTO first follow up:   FOTO second follow up:     Time In: 10:05  Time Out: 11:03  Total Billable Time: 58 minutes    SUBJECTIVE     Pt reports: He saw his MD yesterday, therapy is definitely helping, but the progress has been slow. He think that this in conjunction with some other type of interventions would help    He was compliant with home exercise program.  Response to previous treatment: none noted  Functional change: as above    Pain: 5/10  Location: bilateral generalized low back      OBJECTIVE     Objective Measures updated at progress report unless specified.     Treatment     Cole received the treatments listed below:      therapeutic exercises to develop strength, endurance, ROM, and flexibility for 9 minutes including:  Foam roll x3 minutes  Hand heel rocks x20, 5 second hold with black band to promote inferior/posterior glide B    manual therapy techniques: Joint mobilizations were applied to the: Lumbar spine for 8 minutes, including:  Lumbar gapping mobilization grade III-IV    Not done:  Long axis distraction grade IV  Caudal glides grade IV    neuromuscular re-education activities to improve: Balance, Coordination, Kinesthetic, Sense, and Proprioception for 41 minutes. The following activities were included:  Spinal Segmental mobility, x8  Single leg bridges 3 x 10 repetitions   Step ups contralateral pull down with blue theraband  , 2 x 12  Dead bugs with red theraball, x20  Plank with hip extension, 2 x10 B       Not done:   Lateral walking with red theraband x3, 10 yds down and back  Bent knee fall out with red theraband 3 x 10 repetitions   Posterior pelvic tilt, x10, 5 second hold with abdominal bracing  Paloff press with posterior pelvic tilt and abdominal bracing and rotation, x20 bilaterally, 5 second hold       therapeutic activities to improve functional performance for 00  minutes, including:    Patient Education and Home Exercises     Home Exercises Provided and Patient Education Provided     Education provided:   - HEP education  - POC education  - Activity modification at work    Written Home Exercises Provided: Patient instructed to cont prior HEP. Exercises were reviewed and Cole was able to demonstrate them prior to the end of the session.  Cole demonstrated fair  understanding of the education provided. See EMR under Patient Instructions for exercises provided during therapy sessions    ASSESSMENT     Cole completed therapy with no complications. Therapy focused on developing hip and core motor control as well as developing his overall core strength. Cole continues to display improvements in his core and stability control. He is progressing well, he reports that his symptoms are improving, but it is slow at this time. Therapy will progress as tolerated.     Cole Is progressing well towards his goals.   Pt prognosis is Fair.     Pt will continue to benefit from skilled outpatient physical therapy to address the deficits listed in the problem list box on initial evaluation, provide pt/family education and to maximize pt's level of independence in the home and community environment.     Pt's spiritual, cultural and educational needs considered and pt agreeable to plan of care and goals.     Anticipated barriers to physical therapy: none noted at this time    Goals:  Short Term Goals: 4 weeks. Pt agrees with goals  set.  Pt will demonstrate independence and compliance with initial HEP to improve independence and symptom management.   Pt will report lumbar spine pain </= 3/10 at rest with sitting, standing and laying down to demonstrate improved condition and ability to participate in his leisurely activities.    Pt will improve MMT of hip extensors to >/= 4-/5 to improve tolerance for standing, bending and lifting; to also decrease the demand placed on his lumbar spine.    Pt will improve lumbar ROM by >= 25 % to improve tolerance for bending and lifting to improve ability to complete ADLs and work related task.       Long Term Goals: 8 weeks. Pt agrees with goals set.   Pt will demonstrate independence and compliance with final HEP to continue managing symptoms and developing mobility, motor control and strength.    Pt will improve FOTO score to </= 33% limited to demonstrate improved functional mobility.    Pt will report lumbar spine pain </= 2/10 on exertion  with bending and lifting to demonstrate improved condition and ability to complete work related task and participate in higher level activities.    Pt will improve MMT of hip abductors and hip extensors to >/= 4+/5 to improve hip stability and decrease the demand placed on the lumbar spine; improving his ability to lift objects at work.    Pt will improve lumbar ROM by >/= 50% to improve tolerance for flexion, extension, and side bending to improve his ability to complete lifting at work and facilitate a return to his ADLs and leisurely activities.    Pt goal:  I would like to be able to use the physical therapy to find a method to strengthen my muscles group so that I don't need to take antiinflammatory      PLAN     Develop lumbar mobility and increase core strength    Jared Quintana, PT, DPT

## 2023-12-07 RX ORDER — AMLODIPINE BESYLATE 10 MG/1
10 TABLET ORAL DAILY
COMMUNITY

## 2023-12-07 RX ORDER — DEXTROAMPHETAMINE SACCHARATE, AMPHETAMINE ASPARTATE MONOHYDRATE, DEXTROAMPHETAMINE SULFATE AND AMPHETAMINE SULFATE 7.5; 7.5; 7.5; 7.5 MG/1; MG/1; MG/1; MG/1
30 CAPSULE, EXTENDED RELEASE ORAL EVERY MORNING
COMMUNITY

## 2023-12-08 ENCOUNTER — HOSPITAL ENCOUNTER (OUTPATIENT)
Dept: RADIOLOGY | Facility: HOSPITAL | Age: 38
Discharge: HOME | End: 2023-12-08
Attending: STUDENT IN AN ORGANIZED HEALTH CARE EDUCATION/TRAINING PROGRAM
Payer: COMMERCIAL

## 2023-12-08 DIAGNOSIS — M54.9 BACK PAIN, UNSPECIFIED BACK LOCATION, UNSPECIFIED BACK PAIN LATERALITY, UNSPECIFIED CHRONICITY: ICD-10-CM

## 2023-12-08 DIAGNOSIS — M54.9 DORSALGIA, UNSPECIFIED: ICD-10-CM

## 2023-12-08 PROCEDURE — 72148 MRI LUMBAR SPINE W/O DYE: CPT | Mod: TC

## 2023-12-08 PROCEDURE — 72120 XR LUMBAR SPINE FLEXION AND EXTENSION ONLY: ICD-10-PCS | Mod: 26,,, | Performed by: RADIOLOGY

## 2023-12-08 PROCEDURE — 72120 X-RAY BEND ONLY L-S SPINE: CPT | Mod: TC

## 2023-12-08 PROCEDURE — 72148 MRI LUMBAR SPINE W/O DYE: CPT | Mod: 26,,, | Performed by: RADIOLOGY

## 2023-12-08 PROCEDURE — 72120 X-RAY BEND ONLY L-S SPINE: CPT | Mod: 26,,, | Performed by: RADIOLOGY

## 2023-12-08 PROCEDURE — 72148 MRI LUMBAR SPINE WITHOUT CONTRAST: ICD-10-PCS | Mod: 26,,, | Performed by: RADIOLOGY

## 2023-12-11 ENCOUNTER — HOSPITAL ENCOUNTER (OUTPATIENT)
Facility: HOSPITAL | Age: 38
Discharge: HOME | End: 2023-12-11
Attending: STUDENT IN AN ORGANIZED HEALTH CARE EDUCATION/TRAINING PROGRAM | Admitting: STUDENT IN AN ORGANIZED HEALTH CARE EDUCATION/TRAINING PROGRAM
Payer: COMMERCIAL

## 2023-12-11 DIAGNOSIS — M47.896 OTHER SPONDYLOSIS, LUMBAR REGION: ICD-10-CM

## 2023-12-11 DIAGNOSIS — M47.817 SPONDYLOSIS OF LUMBOSACRAL REGION WITHOUT MYELOPATHY OR RADICULOPATHY: Primary | ICD-10-CM

## 2023-12-11 PROCEDURE — 64493 INJ PARAVERT F JNT L/S 1 LEV: CPT | Mod: 50,,, | Performed by: STUDENT IN AN ORGANIZED HEALTH CARE EDUCATION/TRAINING PROGRAM

## 2023-12-11 PROCEDURE — 64493 PR INJ DX/THER AGNT PARAVERT FACET JOINT,IMG GUIDE,LUMBAR/SAC,1ST LVL: ICD-10-PCS | Mod: 50,,, | Performed by: STUDENT IN AN ORGANIZED HEALTH CARE EDUCATION/TRAINING PROGRAM

## 2023-12-11 PROCEDURE — 63600175 PHARM REV CODE 636 W HCPCS: Performed by: STUDENT IN AN ORGANIZED HEALTH CARE EDUCATION/TRAINING PROGRAM

## 2023-12-11 PROCEDURE — 64493 INJ PARAVERT F JNT L/S 1 LEV: CPT | Mod: 50 | Performed by: STUDENT IN AN ORGANIZED HEALTH CARE EDUCATION/TRAINING PROGRAM

## 2023-12-11 PROCEDURE — 25000003 PHARM REV CODE 250: Performed by: STUDENT IN AN ORGANIZED HEALTH CARE EDUCATION/TRAINING PROGRAM

## 2023-12-11 PROCEDURE — 64494 PR INJ DX/THER AGNT PARAVERT FACET JOINT,IMG GUIDE,LUMBAR/SAC, 2ND LEVEL: ICD-10-PCS | Mod: 50,,, | Performed by: STUDENT IN AN ORGANIZED HEALTH CARE EDUCATION/TRAINING PROGRAM

## 2023-12-11 PROCEDURE — 64494 INJ PARAVERT F JNT L/S 2 LEV: CPT | Mod: 50,,, | Performed by: STUDENT IN AN ORGANIZED HEALTH CARE EDUCATION/TRAINING PROGRAM

## 2023-12-11 PROCEDURE — 64494 INJ PARAVERT F JNT L/S 2 LEV: CPT | Mod: 50 | Performed by: STUDENT IN AN ORGANIZED HEALTH CARE EDUCATION/TRAINING PROGRAM

## 2023-12-11 RX ORDER — LIDOCAINE HYDROCHLORIDE 10 MG/ML
INJECTION, SOLUTION EPIDURAL; INFILTRATION; INTRACAUDAL; PERINEURAL
Status: DISCONTINUED | OUTPATIENT
Start: 2023-12-11 | End: 2023-12-11 | Stop reason: HOSPADM

## 2023-12-11 RX ORDER — LIDOCAINE HYDROCHLORIDE 10 MG/ML
1 INJECTION, SOLUTION EPIDURAL; INFILTRATION; INTRACAUDAL; PERINEURAL ONCE
Status: ACTIVE | OUTPATIENT
Start: 2023-12-11

## 2023-12-11 RX ORDER — SODIUM CHLORIDE, SODIUM LACTATE, POTASSIUM CHLORIDE, CALCIUM CHLORIDE 600; 310; 30; 20 MG/100ML; MG/100ML; MG/100ML; MG/100ML
INJECTION, SOLUTION INTRAVENOUS CONTINUOUS
Status: ACTIVE | OUTPATIENT
Start: 2023-12-11

## 2023-12-11 RX ORDER — BUPIVACAINE HYDROCHLORIDE 2.5 MG/ML
INJECTION, SOLUTION EPIDURAL; INFILTRATION; INTRACAUDAL
Status: DISCONTINUED | OUTPATIENT
Start: 2023-12-11 | End: 2023-12-11 | Stop reason: HOSPADM

## 2023-12-11 NOTE — INTERVAL H&P NOTE
The patient has been examined and the H&P has been reviewed:    I concur with the findings and no changes have occurred since H&P was written.    Procedure risks, benefits and alternative options discussed and understood by patient/family.          There are no hospital problems to display for this patient.    This patient has been cleared for surgery in an ambulatory surgical facility    ASA 3,  Mallampatti Score 3  No history of anesthetic complications  Plan for RN IV sedation

## 2023-12-11 NOTE — DISCHARGE SUMMARY
[facility] - Periop Services  Discharge Note  Short Stay    Procedure(s) (LRB):  Block-nerve-medial branch-lumbar (Bilateral)      OUTCOME: Patient tolerated treatment/procedure well without complication and is now ready for discharge.    DISPOSITION: Home or Self Care    FINAL DIAGNOSIS:  <principal problem not specified>    FOLLOWUP: In clinic    DISCHARGE INSTRUCTIONS:    Discharge Procedure Orders   Notify your health care provider if you experience any of the following:  temperature >100.4     Notify your health care provider if you experience any of the following:  severe uncontrolled pain     Notify your health care provider if you experience any of the following:  redness, tenderness, or signs of infection (pain, swelling, redness, odor or green/yellow discharge around incision site)     Activity as tolerated        TIME SPENT ON DISCHARGE: 20 minutes

## 2023-12-11 NOTE — OR NURSING
From 0915 Patient notified of delay today due to 1st case start delay.  Patient calm and understanding.

## 2023-12-11 NOTE — OP NOTE
Patient: Cole Mendes                                                    MRN: [id number]  : 1985                                              Date of procedure: 2023        Pre Procedure Diagnosis: Back pain, Lumbar spondylosis without myelopathy/ lumbosacral region    Post Procedure Diagnosis: Same    Procedure: Bilateral Lumbar Medial Branch Nerve Block for L4-5 and L5-S1 joints under Fluoroscopy     Attending: Gaviota Hoffman M.D.    Local Anesthetic Injected: 1% Lidocaine 10 ml    Sedation Medications: None    Estimated Blood Loss: None    Complication: None        Procedure:  After informed consent was obtained, patient was taken to the fluoroscopy suite and placed in a prone position.  The skin was prepped and draped in the usual sterile fashion using chlorhexidine.  Anatomical landmarks were identified with the aid of fluoroscopy in PA and Oblique views, and the skin and subcutaneous tissue were infiltrated with a total of 10mL of 1% Lidocaine via a 25-gauge 1.5inch needle at each of the intended entry sites.  Subsequently, three 22-gauge 3.5inch needles were advanced with the aid of fluoroscopy such that their tips were located at the respective positions of the superior medial border of the transverse processes with the posterior elements at each level.  Needle placement was confirmed with the aid of fluoroscopy.  After negative aspiration, 0.5mL of 0.25% Bupivicaine was injected at each level.  This was repeated on the other side. There were no complications or paresthesias.   Patient tolerated the procedure well and all needles were removed intact.    Patient was observed in recovery and discharged home with written instruction under supervision in stable condition.

## 2023-12-12 ENCOUNTER — TELEPHONE (OUTPATIENT)
Dept: PAIN MEDICINE | Facility: CLINIC | Age: 38
End: 2023-12-12
Payer: COMMERCIAL

## 2023-12-12 VITALS
HEIGHT: 72 IN | DIASTOLIC BLOOD PRESSURE: 97 MMHG | HEART RATE: 111 BPM | WEIGHT: 218 LBS | SYSTOLIC BLOOD PRESSURE: 173 MMHG | TEMPERATURE: 98 F | OXYGEN SATURATION: 99 % | RESPIRATION RATE: 18 BRPM | BODY MASS INDEX: 29.53 KG/M2

## 2023-12-12 DIAGNOSIS — M47.816 LUMBAR SPONDYLOSIS: Primary | ICD-10-CM

## 2023-12-14 NOTE — TELEPHONE ENCOUNTER
Spoke with pt he had 90% relief x 4-6  hours his starting pain score was 5 and ending pain score was 0 scheduled 2nd MBB for 01/08.

## 2024-01-05 RX ORDER — LIDOCAINE HYDROCHLORIDE 10 MG/ML
1 INJECTION, SOLUTION EPIDURAL; INFILTRATION; INTRACAUDAL; PERINEURAL ONCE
Status: CANCELLED | OUTPATIENT
Start: 2024-01-05 | End: 2024-01-05

## 2024-01-05 RX ORDER — SODIUM CHLORIDE, SODIUM LACTATE, POTASSIUM CHLORIDE, CALCIUM CHLORIDE 600; 310; 30; 20 MG/100ML; MG/100ML; MG/100ML; MG/100ML
INJECTION, SOLUTION INTRAVENOUS CONTINUOUS
Status: CANCELLED | OUTPATIENT
Start: 2024-01-05

## 2024-01-08 ENCOUNTER — HOSPITAL ENCOUNTER (OUTPATIENT)
Facility: HOSPITAL | Age: 39
Discharge: HOME | End: 2024-01-08
Attending: STUDENT IN AN ORGANIZED HEALTH CARE EDUCATION/TRAINING PROGRAM | Admitting: STUDENT IN AN ORGANIZED HEALTH CARE EDUCATION/TRAINING PROGRAM
Payer: COMMERCIAL

## 2024-01-08 DIAGNOSIS — M47.896 OTHER SPONDYLOSIS, LUMBAR REGION: ICD-10-CM

## 2024-01-08 DIAGNOSIS — M47.817 SPONDYLOSIS OF LUMBOSACRAL REGION WITHOUT MYELOPATHY OR RADICULOPATHY: Primary | ICD-10-CM

## 2024-01-08 PROCEDURE — 64493 INJ PARAVERT F JNT L/S 1 LEV: CPT | Mod: 50 | Performed by: STUDENT IN AN ORGANIZED HEALTH CARE EDUCATION/TRAINING PROGRAM

## 2024-01-08 PROCEDURE — 63600175 PHARM REV CODE 636 W HCPCS: Mod: JZ,JG | Performed by: STUDENT IN AN ORGANIZED HEALTH CARE EDUCATION/TRAINING PROGRAM

## 2024-01-08 PROCEDURE — 25000003 PHARM REV CODE 250: Performed by: STUDENT IN AN ORGANIZED HEALTH CARE EDUCATION/TRAINING PROGRAM

## 2024-01-08 PROCEDURE — 64494 INJ PARAVERT F JNT L/S 2 LEV: CPT | Mod: 50,,, | Performed by: STUDENT IN AN ORGANIZED HEALTH CARE EDUCATION/TRAINING PROGRAM

## 2024-01-08 PROCEDURE — 64494 INJ PARAVERT F JNT L/S 2 LEV: CPT | Mod: 50 | Performed by: STUDENT IN AN ORGANIZED HEALTH CARE EDUCATION/TRAINING PROGRAM

## 2024-01-08 PROCEDURE — 64493 INJ PARAVERT F JNT L/S 1 LEV: CPT | Mod: 50,,, | Performed by: STUDENT IN AN ORGANIZED HEALTH CARE EDUCATION/TRAINING PROGRAM

## 2024-01-08 RX ORDER — LIDOCAINE HYDROCHLORIDE 10 MG/ML
INJECTION, SOLUTION EPIDURAL; INFILTRATION; INTRACAUDAL; PERINEURAL
Status: DISCONTINUED | OUTPATIENT
Start: 2024-01-08 | End: 2024-01-08 | Stop reason: HOSPADM

## 2024-01-08 RX ORDER — LISINOPRIL 5 MG/1
5 TABLET ORAL DAILY
COMMUNITY

## 2024-01-08 RX ORDER — BUPIVACAINE HYDROCHLORIDE 2.5 MG/ML
INJECTION, SOLUTION EPIDURAL; INFILTRATION; INTRACAUDAL
Status: DISCONTINUED | OUTPATIENT
Start: 2024-01-08 | End: 2024-01-08 | Stop reason: HOSPADM

## 2024-01-08 NOTE — H&P
CC: back pain    HPI: The patient is a 38 y.o. male with a history of back pain here for B MBB L4-5 and L5-S1. There are no major changes in history and physical from 11/30/23 by Dr. Hoffman.    Past Medical History:   Diagnosis Date    ADHD     Hypertension        Past Surgical History:   Procedure Laterality Date    INJECTION OF ANESTHETIC AGENT AROUND MEDIAL BRANCH NERVES INNERVATING LUMBAR FACET JOINT Bilateral 12/11/2023    Procedure: Block-nerve-medial branch-lumbar;  Surgeon: Gaviota Hoffman MD;  Location: [facility] OR;  Service: Anesthesiology;  Laterality: Bilateral;  L4/5 and l5/s1 MBB       No family history on file.    Social History     Socioeconomic History    Marital status:        No current facility-administered medications for this encounter.     Facility-Administered Medications Ordered in Other Encounters   Medication Dose Route Frequency Provider Last Rate Last Admin    lactated ringers infusion   Intravenous Continuous Gaviota Hoffman MD        LIDOcaine (PF) 10 mg/ml (1%) injection 10 mg  1 mL Intradermal Once Gaviota Hoffman MD           Review of patient's allergies indicates:   Allergen Reactions    Bactrim [sulfamethoxazole-trimethoprim] Rash    Ceclor [cefaclor] Rash       Vitals:    01/03/24 1033   Weight: 98.9 kg (218 lb)   Height: 6' (1.829 m)       REVIEW OF SYSTEMS:     GENERAL: No weight loss, malaise or fevers.  HEENT:  No recent changes in vision or hearing  NECK: Negative for lumps, no difficulty with swallowing.  RESPIRATORY: Negative for cough, wheezing or shortness of breath, patient denies any recent URI.  CARDIOVASCULAR: Negative for chest pain, leg swelling or palpitations.  GI: Negative for abdominal discomfort, blood in stools or black stools or change in bowel habits.  MUSCULOSKELETAL: See HPI.  SKIN: Negative for lesions, rash, and itching.  PSYCH: No suicidal or homicidal ideations, no current mood disturbances.  HEMATOLOGY/LYMPHOLOGY: Negative for prolonged bleeding, bruising  easily or swollen nodes. Patient is not currently taking any anti-coagulants  ENDO: No history of diabetes or thyroid dysfunction  NEURO: No history of syncope, paralysis, seizures or tremors.All other reviewed and negative other than HPI.    Physical exam:  Gen: A and O x3, pleasant, well-groomed  Skin: No rashes or obvious lesions  HEENT: PERRLA, no obvious deformities on ears or in canals. No thyroid masses, trachea midline, no palpable lymph nodes in neck, axilla.  CVS: Regular rate and rhythm, normal S1 and S2, no murmurs.  Resp: Clear to auscultation bilaterally.  Abdomen: Soft, NT/ND, normal bowel sounds present.  Musculoskeletal/Neuro: Moving all extremities    Assessment:  Spondylosis of lumbosacral region without myelopathy or radiculopathy  -     Place in Outpatient; Standing  -     Vital signs; Standing  -     Verify informed consent; Standing  -     Notify physician ; Standing  -     Notify physician ; Standing  -     Notify physician (specify); Standing  -     Diet NPO; Standing    Other spondylosis, lumbar region    Other orders  -     LIDOcaine (PF) 10 mg/ml (1%) injection 10 mg  -     lactated ringers infusion  -     IP VTE LOW RISK PATIENT; Standing          PLAN: B MBB L4-5 and L5-S1      This patient has been cleared for surgery in an ambulatory surgical facility    ASA 3,  Mallampatti Score 3  No history of anesthetic complications  Plan for RN IV sedation

## 2024-01-08 NOTE — DISCHARGE SUMMARY
[facility] - Periop Services  Discharge Note  Short Stay    Procedure(s) (LRB):  Block-nerve-medial branch-lumbar (Bilateral)      OUTCOME: Patient tolerated treatment/procedure well without complication and is now ready for discharge.    DISPOSITION: Home or Self Care    FINAL DIAGNOSIS:  <principal problem not specified>    FOLLOWUP: In clinic    DISCHARGE INSTRUCTIONS:    Discharge Procedure Orders   Notify your health care provider if you experience any of the following:  temperature >100.4     Notify your health care provider if you experience any of the following:  severe uncontrolled pain     Notify your health care provider if you experience any of the following:  redness, tenderness, or signs of infection (pain, swelling, redness, odor or green/yellow discharge around incision site)     Activity as tolerated        TIME SPENT ON DISCHARGE: 20 minutes

## 2024-01-08 NOTE — OP NOTE
Patient: Cole Mendes                                                    MRN: [id number]  : 1985                                              Date of procedure: 2024        Pre Procedure Diagnosis: Back pain, Lumbar spondylosis without myelopathy/ lumbosacral region    Post Procedure Diagnosis: Same    Procedure: Bilateral Lumbar Medial Branch Nerve Block for L4-5 and L5-S1 joints under Fluoroscopy #2    Attending: Gaviota Hoffman M.D.    Local Anesthetic Injected: 1% Lidocaine 10 ml    Sedation Medications: None    Estimated Blood Loss: None    Complication: None        Procedure:  After informed consent was obtained, patient was taken to the fluoroscopy suite and placed in a prone position.  The skin was prepped and draped in the usual sterile fashion using chlorhexidine.  Anatomical landmarks were identified with the aid of fluoroscopy in PA and Oblique views, and the skin and subcutaneous tissue were infiltrated with a total of 10mL of 1% Lidocaine via a 25-gauge 1.5inch needle at each of the intended entry sites.  Subsequently, three 22-gauge 3.5inch needles were advanced with the aid of fluoroscopy such that their tips were located at the respective positions of the superior medial border of the transverse processes with the posterior elements at each level.  Needle placement was confirmed with the aid of fluoroscopy.  After negative aspiration, 0.5mL of 0.25% Bupivicaine was injected at each level.  This was repeated on the other side. There were no complications or paresthesias.   Patient tolerated the procedure well and all needles were removed intact.    Patient was observed in recovery and discharged home with written instruction under supervision in stable condition.

## 2024-01-10 VITALS
SYSTOLIC BLOOD PRESSURE: 140 MMHG | TEMPERATURE: 98 F | WEIGHT: 218 LBS | RESPIRATION RATE: 16 BRPM | HEART RATE: 96 BPM | DIASTOLIC BLOOD PRESSURE: 86 MMHG | HEIGHT: 72 IN | BODY MASS INDEX: 29.53 KG/M2 | OXYGEN SATURATION: 100 %

## 2024-01-18 ENCOUNTER — OFFICE VISIT (OUTPATIENT)
Dept: NEUROSURGERY | Facility: CLINIC | Age: 39
End: 2024-01-18
Payer: COMMERCIAL

## 2024-01-18 VITALS
SYSTOLIC BLOOD PRESSURE: 144 MMHG | DIASTOLIC BLOOD PRESSURE: 94 MMHG | HEART RATE: 78 BPM | WEIGHT: 210.88 LBS | BODY MASS INDEX: 28.56 KG/M2 | HEIGHT: 72 IN

## 2024-01-18 DIAGNOSIS — M43.06 LUMBAR SPONDYLOLYSIS: Primary | ICD-10-CM

## 2024-01-18 DIAGNOSIS — M54.16 LUMBAR RADICULOPATHY: ICD-10-CM

## 2024-01-18 DIAGNOSIS — M43.17 SPONDYLOLISTHESIS AT L5-S1 LEVEL: ICD-10-CM

## 2024-01-18 PROCEDURE — 99205 OFFICE O/P NEW HI 60 MIN: CPT | Mod: S$GLB,,, | Performed by: NEUROLOGICAL SURGERY

## 2024-01-18 PROCEDURE — 3077F SYST BP >= 140 MM HG: CPT | Mod: CPTII,S$GLB,, | Performed by: NEUROLOGICAL SURGERY

## 2024-01-18 PROCEDURE — 3080F DIAST BP >= 90 MM HG: CPT | Mod: CPTII,S$GLB,, | Performed by: NEUROLOGICAL SURGERY

## 2024-01-18 PROCEDURE — 1159F MED LIST DOCD IN RCRD: CPT | Mod: CPTII,S$GLB,, | Performed by: NEUROLOGICAL SURGERY

## 2024-01-18 PROCEDURE — 3008F BODY MASS INDEX DOCD: CPT | Mod: CPTII,S$GLB,, | Performed by: NEUROLOGICAL SURGERY

## 2024-01-18 RX ORDER — FINASTERIDE 5 MG/1
5 TABLET, FILM COATED ORAL
COMMUNITY
Start: 2023-12-17

## 2024-01-18 NOTE — PROGRESS NOTES
I appreciate this referral from Dr. Hoffman    HPI:  This is a very pleasant 38-year-old gentleman who endorses longstanding axial lumbosacral pain.  He describes aching stabbing tingling pain in the midline lumbosacral region which radiates into bilateral buttock, posterior thigh, lateral calf and top of the foot.  The back pain is greater than leg pain.  His symptoms are worse with prolonged standing walking.  His symptoms are alleviated with lying supine, heating pad, NSAIDs.  He denies weakness, saddle anesthesia, sphincter dysfunction.  No previous history of spinal surgery.  He underwent medial branch blocks L4-5 L5-S1 on 01/28/2024 and 12/11/2023; he reports 1 day of symptom relief with the procedure.  He did previously obtain 70% pain relief, lasting 2 months, with previous lumbar ANDRA.  He works as a .    Smoking status:  None  Past Medical History:   Diagnosis Date    ADHD     Hypertension       Past Surgical History:   Procedure Laterality Date    INJECTION OF ANESTHETIC AGENT AROUND MEDIAL BRANCH NERVES INNERVATING LUMBAR FACET JOINT Bilateral 12/11/2023    Procedure: Block-nerve-medial branch-lumbar;  Surgeon: Gaviota Hoffman MD;  [facility];  Service: Anesthesiology;  Laterality: Bilateral;  L4/5 and l5/s1 MBB    INJECTION OF ANESTHETIC AGENT AROUND MEDIAL BRANCH NERVES INNERVATING LUMBAR FACET JOINT Bilateral 1/8/2024    Procedure: Block-nerve-medial branch-lumbar;  Surgeon: Gaviota Hoffman MD;  Location: [facility] OR;  Service: Anesthesiology;  Laterality: Bilateral;  L4/5 and l5/s1 MBB #2     Social History     Tobacco Use    Smoking status: Never    Smokeless tobacco: Never       Review of Systems: All systems reviewed and are as above or otherwise negative.    General: well developed, well nourished, no distress.   Head: normocephalic, atraumatic  Eyes: pupils equal, round, reactive to light with accomodation, EOMI.   Neck: trachea midline. No JVD  Cardiovascular: No LE edema    Pulmonary: normal respirations, no signs of respiratory distress  Abdomen: soft, non-distended, not tender to palpation  Sensory: intact to light touch throughout  Extremities: No bruising, deformity  Skin: Skin is warm, dry and intact.    Motor Strength: Moves all extremities spontaneously with good tone.  Full strength upper and lower extremities. No abnormal movements seen.     Strength  Deltoids Triceps Biceps Wrist Extension Wrist Flexion Hand    Upper: R 5/5 5/5 5/5 5/5 5/5 5/5    L 5/5 5/5 5/5 5/5 5/5 5/5     Iliopsoas Quadriceps Knee  Flexion Tibialis  anterior Gastro- cnemius EHL   Lower: R 5/5 5/5 5/5 5/5 5/5 5/5    L 5/5 5/5 5/5 5/5 5/5 5/5     Neurologic: Alert and oriented. Thought content appropriate.  GCS: Motor: 6/Verbal: 5/Eyes: 4 GCS Total: 15  Mental Status: Awake, Alert, Oriented x 4  Language: No aphasia  Speech: No dysarthria  Cranial nerves: face symmetric, tongue midline, CN II-XII grossly intact.     Pronator Drift: no drift noted  Finger-to-nose: Intact bilaterally  DTR's: 2 + and symmetric in UE and LE  Santana: absent  Clonus: absent  Babinski: absent    Gait: normal    Tandem Gait: No difficulty           Able to walk on heels & toes    Cervical Spine  ROM: full    Nontender to palpation    Lumbar Spine   ROM: full   Nontender to palpation    Pain on Hip ROM: Negative  Straight leg raise: negative bilaterally     SI Joint tenderness: Negative bilaterally   ROSELYN: Negative bilaterally  Thigh Thrust: Negative bilaterally  Gaenslen: Negative bilaterally    Significant Exam Findings:  Motor and sensory intact.  Mildly antalgic gait    Significant Radiology Findings:  I personally reviewed MRI and x-ray lumbar spine with the patient.  Pertinent findings include bilateral L5 spondylolysis.  Partial lumbarization of S1.  Grade 1 spondylolisthesis L5-S1.  Moderate bilateral foraminal stenosis L5-S1.  Mild disc desiccation L5-S1.    Analysis:  Given the above findings, he would benefit from  surgical intervention.  I outlined a minimally invasive L5-S1 TLIF with instrumented posterolateral fusion using MRI imaging.  I outlined the material risks, goals of surgery, and expected postoperative course.  I explained that continued nonoperative management is also reasonable.  He voiced understanding all the above.  At this point he is inclined to continue with nonoperative management, including interventional pain procedures as needed.  We will be glad to see him back for further discussion at his discretion.    Cole was seen today for establish patient.    Diagnoses and all orders for this visit:    Lumbar spondylolysis    Spondylolisthesis at L5-S1 level  -     Ambulatory referral/consult to Neurosurgery    Lumbar radiculopathy      I spent a total of 60 minutes on the day of the visit.  This includes face to face time and non-face to face time preparing to see the patient (eg, review of tests), obtaining and/or reviewing separately obtained history, documenting clinical information in the electronic or other health record, independently interpreting results and communicating results to the patient/family/caregiver, or care coordinator.

## 2024-02-07 ENCOUNTER — TELEPHONE (OUTPATIENT)
Dept: PAIN MEDICINE | Facility: CLINIC | Age: 39
End: 2024-02-07
Payer: COMMERCIAL

## 2024-02-07 DIAGNOSIS — M47.816 LUMBAR SPONDYLOSIS: Primary | ICD-10-CM

## 2024-02-07 NOTE — TELEPHONE ENCOUNTER
Spoke with pt and  he had 2nd mbb 01/08 he had 80% relief for 6 hours pain before procedure was 5 and after a 0 schedule RFA 02/23/2024.

## 2024-02-07 NOTE — TELEPHONE ENCOUNTER
----- Message from Carlos Hoff sent at 2/7/2024  7:59 AM CST -----  Regarding: nerve block  Type:  Needs Medical Advice    Who Called: Pt        Would the patient rather a call back or a response via [facility]? Call back    Best Call Back Number: [phone]      Additional Information: Sts he had the first 2 of the nerve block now wants to know what's the next step and when it needs to be done.  Would like to talk to someone.   Please advise -- Thank you

## 2024-02-22 RX ORDER — SODIUM CHLORIDE, SODIUM LACTATE, POTASSIUM CHLORIDE, CALCIUM CHLORIDE 600; 310; 30; 20 MG/100ML; MG/100ML; MG/100ML; MG/100ML
INJECTION, SOLUTION INTRAVENOUS CONTINUOUS
Status: CANCELLED | OUTPATIENT
Start: 2024-02-22

## 2024-02-22 RX ORDER — LIDOCAINE HYDROCHLORIDE 10 MG/ML
1 INJECTION, SOLUTION EPIDURAL; INFILTRATION; INTRACAUDAL; PERINEURAL ONCE
Status: CANCELLED | OUTPATIENT
Start: 2024-02-22 | End: 2024-02-22

## 2024-02-23 ENCOUNTER — HOSPITAL ENCOUNTER (OUTPATIENT)
Facility: HOSPITAL | Age: 39
Discharge: HOME | End: 2024-02-23
Attending: STUDENT IN AN ORGANIZED HEALTH CARE EDUCATION/TRAINING PROGRAM | Admitting: STUDENT IN AN ORGANIZED HEALTH CARE EDUCATION/TRAINING PROGRAM
Payer: COMMERCIAL

## 2024-02-23 VITALS
RESPIRATION RATE: 14 BRPM | SYSTOLIC BLOOD PRESSURE: 139 MMHG | WEIGHT: 214 LBS | HEART RATE: 88 BPM | OXYGEN SATURATION: 98 % | BODY MASS INDEX: 29.96 KG/M2 | HEIGHT: 71 IN | DIASTOLIC BLOOD PRESSURE: 90 MMHG | TEMPERATURE: 99 F

## 2024-02-23 DIAGNOSIS — M47.817 SPONDYLOSIS OF LUMBOSACRAL REGION WITHOUT MYELOPATHY OR RADICULOPATHY: Primary | ICD-10-CM

## 2024-02-23 DIAGNOSIS — M54.16 LUMBAR RADICULITIS: ICD-10-CM

## 2024-02-23 PROCEDURE — 63600175 PHARM REV CODE 636 W HCPCS: Performed by: STUDENT IN AN ORGANIZED HEALTH CARE EDUCATION/TRAINING PROGRAM

## 2024-02-23 PROCEDURE — 64635 DESTROY LUMB/SAC FACET JNT: CPT | Mod: 50,,, | Performed by: STUDENT IN AN ORGANIZED HEALTH CARE EDUCATION/TRAINING PROGRAM

## 2024-02-23 PROCEDURE — 36000705 HC OR TIME LEV I EA ADD 15 MIN: Performed by: STUDENT IN AN ORGANIZED HEALTH CARE EDUCATION/TRAINING PROGRAM

## 2024-02-23 PROCEDURE — 64636 DESTROY L/S FACET JNT ADDL: CPT | Mod: 50,,, | Performed by: STUDENT IN AN ORGANIZED HEALTH CARE EDUCATION/TRAINING PROGRAM

## 2024-02-23 PROCEDURE — 99152 MOD SED SAME PHYS/QHP 5/>YRS: CPT | Performed by: STUDENT IN AN ORGANIZED HEALTH CARE EDUCATION/TRAINING PROGRAM

## 2024-02-23 PROCEDURE — 36000704 HC OR TIME LEV I 1ST 15 MIN: Performed by: STUDENT IN AN ORGANIZED HEALTH CARE EDUCATION/TRAINING PROGRAM

## 2024-02-23 PROCEDURE — 25000003 PHARM REV CODE 250: Performed by: STUDENT IN AN ORGANIZED HEALTH CARE EDUCATION/TRAINING PROGRAM

## 2024-02-23 PROCEDURE — 71000015 HC POSTOP RECOV 1ST HR: Performed by: STUDENT IN AN ORGANIZED HEALTH CARE EDUCATION/TRAINING PROGRAM

## 2024-02-23 RX ORDER — MIDAZOLAM HYDROCHLORIDE 1 MG/ML
INJECTION INTRAMUSCULAR; INTRAVENOUS
Status: DISCONTINUED | OUTPATIENT
Start: 2024-02-23 | End: 2024-02-23 | Stop reason: HOSPADM

## 2024-02-23 RX ORDER — FENTANYL CITRATE 50 UG/ML
INJECTION, SOLUTION INTRAMUSCULAR; INTRAVENOUS
Status: DISCONTINUED | OUTPATIENT
Start: 2024-02-23 | End: 2024-02-23 | Stop reason: HOSPADM

## 2024-02-23 RX ORDER — BUPIVACAINE HYDROCHLORIDE 2.5 MG/ML
INJECTION, SOLUTION EPIDURAL; INFILTRATION; INTRACAUDAL
Status: DISCONTINUED | OUTPATIENT
Start: 2024-02-23 | End: 2024-02-23 | Stop reason: HOSPADM

## 2024-02-23 RX ORDER — LIDOCAINE HYDROCHLORIDE 20 MG/ML
INJECTION, SOLUTION EPIDURAL; INFILTRATION; INTRACAUDAL; PERINEURAL
Status: DISCONTINUED | OUTPATIENT
Start: 2024-02-23 | End: 2024-02-23 | Stop reason: HOSPADM

## 2024-02-23 RX ORDER — DEXAMETHASONE SODIUM PHOSPHATE 100 MG/10ML
INJECTION INTRAMUSCULAR; INTRAVENOUS
Status: DISCONTINUED | OUTPATIENT
Start: 2024-02-23 | End: 2024-02-23 | Stop reason: HOSPADM

## 2024-02-23 RX ADMIN — SODIUM CHLORIDE, SODIUM GLUCONATE, SODIUM ACETATE, POTASSIUM CHLORIDE AND MAGNESIUM CHLORIDE: 526; 502; 368; 37; 30 INJECTION, SOLUTION INTRAVENOUS at 08:02

## 2024-02-23 NOTE — OP NOTE
Patient: Cole Mendes                                                    MRN: [id number]  : 1985                                              Date of procedure: 2024      Pre Procedure Diagnosis: Low back pain, Lumbago, Lumbar spondylosis without myelopathy/ lumbrosacral region    Post Procedure Diagnosis: Same    Procedure: Bilateral Lumbar Medial Branch Nerve Rhizotomy for  L4-5 and L5-S1 joints under Fluoroscopy     Attending: Gaviota Hoffman MD    Local Anesthetic Injected: 1% Lidocaine 10 ml    Sedation Medications: See RN note    Estimated Blood Loss: None    Complication: None      Procedure:  After informed consent was obtained, patient was taken to the fluoroscopy suite and placed in a prone position.  The skin was prepped and draped in the usual sterile fashion using chlorhexidine.  Anatomical landmarks were identified with the aid of fluoroscopy in PA and Oblique views, and the skin and subcutaneous tissue were infiltrated with a total of 5mL of 1% Lidocaine via a 25-gauge 1.5inch needle at each of the intended entry sites.  Subsequently, three 22-gauge 100mm 10mm tip introducer needles were advanced with the aid of fluoroscopy such that their tips were located at the respective positions of the superior medial border of the transverse processes with the posterior elements at each level.  Needle placement was confirmed with the aid of fluoroscopy.  Motor stimulation up to 2 Volts at each level confirmed no motor nerve involvement.  Impedance was less than 800 ohms at each level.  1ml of 2% lidocaine was instilled prior to lesioning.  Ablation was performed per level utilizing radiofrequency generator 80°C for 90 seconds. Then 0.5mL of a mixture of 0.5mL of Dexamethasone 10mg/mL and 2mL of 0.25% Bupivacaine was injected at each level.  There were no complications or paresthesias.   Patient tolerated the procedure well and all needles were removed intact.    Patient was observed in recovery and  discharged home with written instruction under supervision in stable condition.

## 2024-02-23 NOTE — DISCHARGE INSTRUCTIONS
We hope your stay was comfortable as you heal now, mend and rest.    For we have enjoyed taking care of you by giving your our best.    And as you get better, by regaining your health and strength;   We count it as a privilege to have served you and hope your time at [facility] was well spent.      Thank  You!!!

## 2024-02-23 NOTE — H&P
"CC: back pain    HPI: The patient is a 38 y.o. male with a history of back pain here for bilateral RFA L4-5 and L5-S1. There are no major changes in history and physical from 1/8/24 by Dr. Hoffman.    Past Medical History:   Diagnosis Date    ADHD     Hypertension        Past Surgical History:   Procedure Laterality Date    INJECTION OF ANESTHETIC AGENT AROUND MEDIAL BRANCH NERVES INNERVATING LUMBAR FACET JOINT Bilateral 12/11/2023    Procedure: Block-nerve-medial branch-lumbar;  Surgeon: Gaviota Hoffman MD;  [facility];  Service: Anesthesiology;  Laterality: Bilateral;  L4/5 and l5/s1 MBB    INJECTION OF ANESTHETIC AGENT AROUND MEDIAL BRANCH NERVES INNERVATING LUMBAR FACET JOINT Bilateral 1/8/2024    Procedure: Block-nerve-medial branch-lumbar;  Surgeon: Gaviota Hoffman MD;  [facility];  Service: Anesthesiology;  Laterality: Bilateral;  L4/5 and l5/s1 MBB #2       History reviewed. No pertinent family history.    Social History     Socioeconomic History    Marital status:    Tobacco Use    Smoking status: Never    Smokeless tobacco: Never       No current facility-administered medications for this encounter.     Facility-Administered Medications Ordered in Other Encounters   Medication Dose Route Frequency Provider Last Rate Last Admin    lactated ringers infusion   Intravenous Continuous Gaviota Hoffman MD        LIDOcaine (PF) 10 mg/ml (1%) injection 10 mg  1 mL Intradermal Once Gaviota Hoffman MD           Review of patient's allergies indicates:   Allergen Reactions    Bactrim [sulfamethoxazole-trimethoprim] Rash    Ceclor [cefaclor] Rash       Vitals:    02/23/24 0750   BP: (!) 157/73   Pulse: 86   Resp: 16   Temp: 98.6 °F (37 °C)   TempSrc: Temporal   SpO2: 99%   Weight: 97.1 kg (214 lb)   Height: 5' 11" (1.803 m)       REVIEW OF SYSTEMS:     GENERAL: No weight loss, malaise or fevers.  HEENT:  No recent changes in vision or hearing  NECK: Negative for lumps, no difficulty with " swallowing.  RESPIRATORY: Negative for cough, wheezing or shortness of breath, patient denies any recent URI.  CARDIOVASCULAR: Negative for chest pain, leg swelling or palpitations.  GI: Negative for abdominal discomfort, blood in stools or black stools or change in bowel habits.  MUSCULOSKELETAL: See HPI.  SKIN: Negative for lesions, rash, and itching.  PSYCH: No suicidal or homicidal ideations, no current mood disturbances.  HEMATOLOGY/LYMPHOLOGY: Negative for prolonged bleeding, bruising easily or swollen nodes. Patient is not currently taking any anti-coagulants  ENDO: No history of diabetes or thyroid dysfunction  NEURO: No history of syncope, paralysis, seizures or tremors.All other reviewed and negative other than HPI.    Physical exam:  Gen: A and O x3, pleasant, well-groomed  Skin: No rashes or obvious lesions  HEENT: PERRLA, no obvious deformities on ears or in canals. No thyroid masses, trachea midline, no palpable lymph nodes in neck, axilla.  CVS: Regular rate and rhythm, normal S1 and S2, no murmurs.  Resp: Clear to auscultation bilaterally.  Abdomen: Soft, NT/ND, normal bowel sounds present.  Musculoskeletal/Neuro: Moving all extremities    Assessment:  Spondylosis of lumbosacral region without myelopathy or radiculopathy    Lumbar radiculitis          PLAN: B RFA L4-5 and L5-S1      This patient has been cleared for surgery in an ambulatory surgical facility    ASA 3,  Mallampatti Score 3  No history of anesthetic complications  Plan for RN IV sedation

## 2024-02-23 NOTE — DISCHARGE SUMMARY
[facility]  Discharge Note  Short Stay    Procedure(s) (LRB):  Radiofrequency Ablation, Nerve, Spinal, Lumbar, Medial Branch, 1 Level (Bilateral)      OUTCOME: Patient tolerated treatment/procedure well without complication and is now ready for discharge.    DISPOSITION: Home or Self Care    FINAL DIAGNOSIS:  <principal problem not specified>    FOLLOWUP: In clinic    DISCHARGE INSTRUCTIONS:    Discharge Procedure Orders   Notify your health care provider if you experience any of the following:  temperature >100.4     Notify your health care provider if you experience any of the following:  severe uncontrolled pain     Notify your health care provider if you experience any of the following:  redness, tenderness, or signs of infection (pain, swelling, redness, odor or green/yellow discharge around incision site)     Activity as tolerated         Clinical Reference Documents Added to Patient Instructions         Document    RADIOFREQUENCY ABLATION FOR PAIN (ENGLISH)            TIME SPENT ON DISCHARGE: 20 minutes

## 2024-02-23 NOTE — PLAN OF CARE
Pt POC explained, v/u. Discharge instructions reviewed, v/u. All questions answered. Pt discharged via wheelchair with RN to private vehicle with family member in stable condition.

## 2024-02-23 NOTE — OR NURSING
Patient in room, placed on monitors. Positioned with Safety precautions maintained. O2 in place. VS WNL. Patient Stable. Will continue to monitor.

## 2024-03-26 ENCOUNTER — OFFICE VISIT (OUTPATIENT)
Dept: PAIN MEDICINE | Facility: CLINIC | Age: 39
End: 2024-03-26
Payer: COMMERCIAL

## 2024-03-26 VITALS — BODY MASS INDEX: 29.96 KG/M2 | WEIGHT: 214 LBS | HEIGHT: 71 IN

## 2024-03-26 DIAGNOSIS — M54.9 BACK PAIN, UNSPECIFIED BACK LOCATION, UNSPECIFIED BACK PAIN LATERALITY, UNSPECIFIED CHRONICITY: Primary | ICD-10-CM

## 2024-03-26 PROCEDURE — 4010F ACE/ARB THERAPY RXD/TAKEN: CPT | Mod: CPTII,S$GLB,, | Performed by: STUDENT IN AN ORGANIZED HEALTH CARE EDUCATION/TRAINING PROGRAM

## 2024-03-26 PROCEDURE — 1159F MED LIST DOCD IN RCRD: CPT | Mod: CPTII,S$GLB,, | Performed by: STUDENT IN AN ORGANIZED HEALTH CARE EDUCATION/TRAINING PROGRAM

## 2024-03-26 PROCEDURE — 1160F RVW MEDS BY RX/DR IN RCRD: CPT | Mod: CPTII,S$GLB,, | Performed by: STUDENT IN AN ORGANIZED HEALTH CARE EDUCATION/TRAINING PROGRAM

## 2024-03-26 PROCEDURE — 3008F BODY MASS INDEX DOCD: CPT | Mod: CPTII,S$GLB,, | Performed by: STUDENT IN AN ORGANIZED HEALTH CARE EDUCATION/TRAINING PROGRAM

## 2024-03-26 PROCEDURE — 99999 PR PBB SHADOW E&M-EST. PATIENT-LVL III: CPT | Mod: PBBFAC,,, | Performed by: STUDENT IN AN ORGANIZED HEALTH CARE EDUCATION/TRAINING PROGRAM

## 2024-03-26 PROCEDURE — 99213 OFFICE O/P EST LOW 20 MIN: CPT | Mod: S$GLB,,, | Performed by: STUDENT IN AN ORGANIZED HEALTH CARE EDUCATION/TRAINING PROGRAM

## 2024-03-26 NOTE — PROGRESS NOTES
Office Note    No, Primary Doctor      First Office Visit: 10/19/23  Today' Date: 3/26/2024  Last Office Visit: 10/19/23    Chief complaint: back pain    HPI: Pt is a pleasent 38 y.o., who presents for evaluation. Referred by self. Pt seen previously for back pain of years. Of note, pt has grade 1 anterolisthesis of L5 on S1 with movement with flexion and extension. Pt has seen Dr. Umanzor. Pt was seen today for follow-up from B RFA L4-5 and L5-S1 2/23/24. States he has had 90% pain relief and has been able to move around with no issues. States back pain is almost completely resolved. Does endorse some tightness of his spine that is better with forward flexing. No BB changes. No new leg weakness or neurology deficit. Is doing HEP.     Pain score: 4  Pain disability score: 28      Relevant Imaging/ Testing:   XR L-spine flex/ex 12/23 - grade 1 anterolisthesis of L5 on S1 with 5 mm anterior subluxation during extension and 3 mm anterior subluxation during flexion   MR L-spine 12/23  XR L-spine 10/23    Procedures:   B RFA L4-5 and L5-S1 2/23/24  B MBB L4-5 and L5-S1 12/11/23, 1/8/24    Date of board of pharmacy review:3/26/2024  Date of opioid risk screening/ pain psych: None  Date of opioid agreement and consent: None  Date of urine drug screen: None  Date of random pill count: None     was reviewed today: reviewed, no concerns     Prescribed medications: None    See EHR for  PMH, PSH, FH, SH, Medications and Allergy    ROS:  Positive for pain  ROS     PE:  There were no vitals filed for this visit.    General: Pleasant, no distress  HEENT: NC/ AT. PERRLA  CV: Radial pulses intact  Pulm: No distress  Ext: No edema    Physical Exam     Neuromusculoskeletal:  Head: NC, AT. PERRLA.  Neck: Intact range of motions  Shoulder: Intact range of motion  Lumbar: Intact range of motion. Pain with extension. Pos Facet loading bilaterally. Min Tenderness. Neg SL   Hip: Intact range of motion  SI: Level, min tenderness  Knee:  Intact range of motion  Reflexes: normal Knee  Strength: 5/5 globally   Sensory: Grossly intact   Skin: No bruising, erythema  Gait: Normal      Impression:  Back pain  Axial back pain  Grade 1 anterolisthesis L5 on S1 with 5 mm anterior subluxation during extension and 3 mm anterior subluxation during flexion - pt has seen Dr. Umanzor   Relevant History  None         Plan:  Discussed options  Imaging/ relevant records viewed/ reviewed/ discussed  Imaging results viewed and reviewed (noted above)/ reviewed with patient   reviewed  Cont HEP  Consider ILESI L5-S1 for tightness relieved with forward flexion - pt states is minimal currently   Consider prn RFA for axial back pain   Cont care with Dr. Umanzor  Re-eval 6 mos       Prescribed medications:  1. None    The impression and plan were discussed and explained in detail. All the questions were answered. Education was provided accordingly.       Follow-up:  6 mos or sooner if needed    Gaviota Hoffman MD

## 2024-05-01 ENCOUNTER — OCCUPATIONAL HEALTH (OUTPATIENT)
Dept: URGENT CARE | Facility: CLINIC | Age: 39
End: 2024-05-01

## 2024-05-01 PROCEDURE — 82482 ASSAY RBC CHOLINESTERASE: CPT | Mod: S$GLB,,,

## 2024-05-06 LAB — CHOLINESTERASE SERPL-CCNC: 2778 IU/L (ref 1801–3537)

## 2024-05-13 ENCOUNTER — OCCUPATIONAL HEALTH (OUTPATIENT)
Dept: URGENT CARE | Facility: CLINIC | Age: 39
End: 2024-05-13

## 2024-05-13 PROCEDURE — 82482 ASSAY RBC CHOLINESTERASE: CPT | Mod: S$GLB,,, | Performed by: EMERGENCY MEDICINE

## 2024-08-27 ENCOUNTER — OFFICE VISIT (OUTPATIENT)
Dept: PAIN MEDICINE | Facility: CLINIC | Age: 39
End: 2024-08-27
Payer: COMMERCIAL

## 2024-08-27 VITALS — WEIGHT: 214 LBS | BODY MASS INDEX: 29.96 KG/M2 | HEIGHT: 71 IN

## 2024-08-27 DIAGNOSIS — M54.9 BACK PAIN, UNSPECIFIED BACK LOCATION, UNSPECIFIED BACK PAIN LATERALITY, UNSPECIFIED CHRONICITY: Primary | ICD-10-CM

## 2024-08-27 PROCEDURE — 1160F RVW MEDS BY RX/DR IN RCRD: CPT | Mod: CPTII,S$GLB,, | Performed by: STUDENT IN AN ORGANIZED HEALTH CARE EDUCATION/TRAINING PROGRAM

## 2024-08-27 PROCEDURE — 4010F ACE/ARB THERAPY RXD/TAKEN: CPT | Mod: CPTII,S$GLB,, | Performed by: STUDENT IN AN ORGANIZED HEALTH CARE EDUCATION/TRAINING PROGRAM

## 2024-08-27 PROCEDURE — 1159F MED LIST DOCD IN RCRD: CPT | Mod: CPTII,S$GLB,, | Performed by: STUDENT IN AN ORGANIZED HEALTH CARE EDUCATION/TRAINING PROGRAM

## 2024-08-27 PROCEDURE — 3008F BODY MASS INDEX DOCD: CPT | Mod: CPTII,S$GLB,, | Performed by: STUDENT IN AN ORGANIZED HEALTH CARE EDUCATION/TRAINING PROGRAM

## 2024-08-27 PROCEDURE — 99999 PR PBB SHADOW E&M-EST. PATIENT-LVL III: CPT | Mod: PBBFAC,,, | Performed by: STUDENT IN AN ORGANIZED HEALTH CARE EDUCATION/TRAINING PROGRAM

## 2024-08-27 PROCEDURE — 99212 OFFICE O/P EST SF 10 MIN: CPT | Mod: S$GLB,,, | Performed by: STUDENT IN AN ORGANIZED HEALTH CARE EDUCATION/TRAINING PROGRAM

## 2024-08-27 NOTE — PROGRESS NOTES
Office Note    No, Primary Doctor      First Office Visit: 10/19/23  Today' Date: 8/27/2024  Last Office Visit: 3/26/24    Chief complaint: back pain    HPI: Pt is a pleasent 38 y.o., who presents for evaluation. Referred by self. Pt seen previously for back pain of years. Of note, pt has grade 1 anterolisthesis of L5 on S1 with movement with flexion and extension. Pt has seen Dr. Umanzor. Pt was seen today for routine f/u. Has been doing well with minimal back pain since RFA. Occasionally pt thought pain was starting to come back but states it never stayed. No leg weakness or neurologic deficit. No BB changes. Is doing HEP.     Pain score: 4  Pain disability score: 28      Relevant Imaging/ Testing:   XR L-spine flex/ex 12/23 - grade 1 anterolisthesis of L5 on S1 with 5 mm anterior subluxation during extension and 3 mm anterior subluxation during flexion   MR L-spine 12/23  XR L-spine 10/23    Procedures:   B RFA L4-5 and L5-S1 2/23/24  B MBB L4-5 and L5-S1 12/11/23, 1/8/24    Date of board of pharmacy review:8/27/2024  Date of opioid risk screening/ pain psych: None  Date of opioid agreement and consent: None  Date of urine drug screen: None  Date of random pill count: None     was reviewed today: reviewed, no concerns     Prescribed medications: None    See EHR for  PMH, PSH, FH, SH, Medications and Allergy    ROS:  Positive for pain  ROS     PE:  There were no vitals filed for this visit.    General: Pleasant, no distress  HEENT: NC/ AT. PERRLA  CV: Radial pulses intact  Pulm: No distress  Ext: No edema    Physical Exam     Neuromusculoskeletal:  Head: NC, AT. PERRLA.  Neck: Intact range of motions  Shoulder: Intact range of motion  Lumbar: Intact range of motion. Pain with extension. Pos Facet loading bilaterally. Min Tenderness. Neg SL   Hip: Intact range of motion  SI: Level, min tenderness  Knee: Intact range of motion  Reflexes: normal Knee  Strength: 5/5 globally   Sensory: Grossly intact   Skin: No  bruising, erythema  Gait: Normal      Impression:  Back pain  Axial back pain  Grade 1 anterolisthesis L5 on S1 with 5 mm anterior subluxation during extension and 3 mm anterior subluxation during flexion - pt has seen Dr. Umanzor   Relevant History  None         Plan:  Discussed options  Imaging/ relevant records viewed/ reviewed/ discussed  Imaging results viewed and reviewed (noted above)/ reviewed with patient   reviewed  Cont HEP  Consider ILESI L5-S1 for tightness relieved with forward flexion - pt states is minimal currently   Consider prn RFA for axial back pain   Back to Dr. Umanzor if needed        Prescribed medications:  1. None    The impression and plan were discussed and explained in detail. All the questions were answered. Education was provided accordingly.       Follow-up:  As needed    Gaviota Hoffman MD

## 2024-11-12 ENCOUNTER — TELEPHONE (OUTPATIENT)
Dept: PAIN MEDICINE | Facility: CLINIC | Age: 39
End: 2024-11-12

## 2024-11-12 ENCOUNTER — OFFICE VISIT (OUTPATIENT)
Dept: PAIN MEDICINE | Facility: CLINIC | Age: 39
End: 2024-11-12
Payer: COMMERCIAL

## 2024-11-12 VITALS — BODY MASS INDEX: 29.96 KG/M2 | HEIGHT: 71 IN | WEIGHT: 214 LBS

## 2024-11-12 DIAGNOSIS — M47.817 SPONDYLOSIS OF LUMBOSACRAL REGION WITHOUT MYELOPATHY OR RADICULOPATHY: Primary | ICD-10-CM

## 2024-11-12 PROCEDURE — 4010F ACE/ARB THERAPY RXD/TAKEN: CPT | Mod: CPTII,S$GLB,, | Performed by: STUDENT IN AN ORGANIZED HEALTH CARE EDUCATION/TRAINING PROGRAM

## 2024-11-12 PROCEDURE — 3008F BODY MASS INDEX DOCD: CPT | Mod: CPTII,S$GLB,, | Performed by: STUDENT IN AN ORGANIZED HEALTH CARE EDUCATION/TRAINING PROGRAM

## 2024-11-12 PROCEDURE — 99999 PR PBB SHADOW E&M-EST. PATIENT-LVL III: CPT | Mod: PBBFAC,,, | Performed by: STUDENT IN AN ORGANIZED HEALTH CARE EDUCATION/TRAINING PROGRAM

## 2024-11-12 PROCEDURE — 99214 OFFICE O/P EST MOD 30 MIN: CPT | Mod: S$GLB,,, | Performed by: STUDENT IN AN ORGANIZED HEALTH CARE EDUCATION/TRAINING PROGRAM

## 2024-11-12 PROCEDURE — 1160F RVW MEDS BY RX/DR IN RCRD: CPT | Mod: CPTII,S$GLB,, | Performed by: STUDENT IN AN ORGANIZED HEALTH CARE EDUCATION/TRAINING PROGRAM

## 2024-11-12 PROCEDURE — 1159F MED LIST DOCD IN RCRD: CPT | Mod: CPTII,S$GLB,, | Performed by: STUDENT IN AN ORGANIZED HEALTH CARE EDUCATION/TRAINING PROGRAM

## 2024-11-12 NOTE — TELEPHONE ENCOUNTER
Types of orders made on 11/12/2024: Procedure Request      Order Date:11/12/2024   Ordering User:VILLA PAIGE [[id number]]   Encounter Provider:Villa Paige MD [[id number]]   Authorizing Provider: Villa Paige MD [[id number]]   Department:[facility][[id number]]      Common Order Information   Procedure -> Radiofrequency Ablation (Specify level and laterality) Cmt: L4-5             and L5-S1      Order Specific Information   Order: Procedure Order to Pain Management [Custom: [id number]]  Order #:          [id number]: 1 FUTURE     Priority: Routine  Class: Clinic Performed     Future Order Information       Expires on:11/12/2025            Expected by:11/12/2024                   Associated Diagnoses       M47.817 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

## 2024-11-13 NOTE — H&P (VIEW-ONLY)
Office Note    No, Primary Doctor      First Office Visit: 10/19/23  Today' Date: 11/12/2024  Last Office Visit: 3/26/24    Chief complaint: back pain    HPI: Pt is a pleasant 39 y.o., who presents for evaluation. Referred by self. Pt seen previously for back pain of years. Of note, pt has grade 1 anterolisthesis of L5 on S1 with movement with flexion and extension. Pt had an RFA back in Feb with resolution of his low back pain that stayed in his back. States pain recently returned. Continues to deny having any sharp, shooting pain going down the legs. States the same pain has returned. No weakness or BB changes. Is doing HEP.     Pain score: 4  Pain disability score: 28      Relevant Imaging/ Testing:   XR L-spine flex/ex 12/23 - grade 1 anterolisthesis of L5 on S1 with 5 mm anterior subluxation during extension and 3 mm anterior subluxation during flexion   MR L-spine 12/23  XR L-spine 10/23    Procedures:   B RFA L4-5 and L5-S1 2/23/24  B MBB L4-5 and L5-S1 12/11/23, 1/8/24    Date of board of pharmacy review:11/12/2024  Date of opioid risk screening/ pain psych: None  Date of opioid agreement and consent: None  Date of urine drug screen: None  Date of random pill count: None     was reviewed today: reviewed, no concerns     Prescribed medications: None    See EHR for  PMH, PSH, FH, SH, Medications and Allergy    ROS:  Positive for pain  ROS     PE:  There were no vitals filed for this visit.    General: Pleasant, no distress  HEENT: NC/ AT. PERRLA  CV: Radial pulses intact  Pulm: No distress  Ext: No edema    Physical Exam     Neuromusculoskeletal:  Head: NC, AT. PERRLA.  Neck: Intact range of motions  Shoulder: Intact range of motion  Lumbar: Intact range of motion. Pain with extension. Pos Facet loading bilaterally. Min Tenderness. Neg SL   Hip: Intact range of motion  SI: Level, min tenderness  Knee: Intact range of motion  Reflexes: normal Knee  Strength: 5/5 globally   Sensory: Grossly intact   Skin: No  bruising, erythema  Gait: Normal      Impression:  Back pain  Relevant History  None     Assessment:  Grade 1 anterolisthesis L5 on S1 with 5 mm anterior subluxation during extension and 3 mm anterior subluxation during flexion - pt has seen Dr. Umanzor   Axial back pain    Plan:  Discussed options  Imaging/ relevant records viewed/ reviewed/ discussed  Imaging results viewed and reviewed (noted above)/ reviewed with patient   reviewed  Cont HEP  Repeat RFA L4-5 and L5-S1 - states same sxs have returned since it went away with last RFA  Re-eval after  Consider ILESI L5-S1 if pain unresponsive to repeat RFA in which case pain is most likely from grade 1 spondy with dynamic movement   Back to Dr. Umanzor if needed        Prescribed medications:  1. None    The impression and plan were discussed and explained in detail. All the questions were answered. Education was provided accordingly.     The procedure was explained in detail, along with risks and potential side effects.          Follow-up:  For procedure     Gaviota Hoffman MD

## 2024-11-26 NOTE — OR NURSING
PRE-OP PHONE CALL COMPLETE. PT AWARE THAT HE WILL BE GETTING IV SEDATION AND IS REQ'D TO HAVE A . INSTRUCTIONS REVIEWED. ALL QUESTIONS ANSWERED. VERBALIZED UNDERSTANDING. INSTRUCTIONS ALSO SENT TO MY CHART.

## 2024-11-29 ENCOUNTER — HOSPITAL ENCOUNTER (OUTPATIENT)
Facility: HOSPITAL | Age: 39
Discharge: HOME | End: 2024-11-29
Attending: STUDENT IN AN ORGANIZED HEALTH CARE EDUCATION/TRAINING PROGRAM | Admitting: STUDENT IN AN ORGANIZED HEALTH CARE EDUCATION/TRAINING PROGRAM
Payer: COMMERCIAL

## 2024-11-29 VITALS
RESPIRATION RATE: 18 BRPM | DIASTOLIC BLOOD PRESSURE: 76 MMHG | WEIGHT: 214 LBS | HEIGHT: 71 IN | BODY MASS INDEX: 29.96 KG/M2 | HEART RATE: 87 BPM | OXYGEN SATURATION: 97 % | TEMPERATURE: 98 F | SYSTOLIC BLOOD PRESSURE: 126 MMHG

## 2024-11-29 DIAGNOSIS — R29.898 WEAKNESS OF BOTH HIPS: ICD-10-CM

## 2024-11-29 DIAGNOSIS — M53.86 DECREASED ROM OF LUMBAR SPINE: ICD-10-CM

## 2024-11-29 DIAGNOSIS — M47.817 SPONDYLOSIS OF LUMBOSACRAL REGION WITHOUT MYELOPATHY OR RADICULOPATHY: Primary | ICD-10-CM

## 2024-11-29 DIAGNOSIS — M47.896 OTHER SPONDYLOSIS, LUMBAR REGION: ICD-10-CM

## 2024-11-29 PROCEDURE — 64636 DESTROY L/S FACET JNT ADDL: CPT

## 2024-11-29 PROCEDURE — 71000015 HC POSTOP RECOV 1ST HR: Performed by: STUDENT IN AN ORGANIZED HEALTH CARE EDUCATION/TRAINING PROGRAM

## 2024-11-29 PROCEDURE — 63600175 PHARM REV CODE 636 W HCPCS: Performed by: STUDENT IN AN ORGANIZED HEALTH CARE EDUCATION/TRAINING PROGRAM

## 2024-11-29 PROCEDURE — 36000705 HC OR TIME LEV I EA ADD 15 MIN: Performed by: STUDENT IN AN ORGANIZED HEALTH CARE EDUCATION/TRAINING PROGRAM

## 2024-11-29 PROCEDURE — 64636 DESTROY L/S FACET JNT ADDL: CPT | Mod: 50,,, | Performed by: STUDENT IN AN ORGANIZED HEALTH CARE EDUCATION/TRAINING PROGRAM

## 2024-11-29 PROCEDURE — 64635 DESTROY LUMB/SAC FACET JNT: CPT | Mod: 50,,, | Performed by: STUDENT IN AN ORGANIZED HEALTH CARE EDUCATION/TRAINING PROGRAM

## 2024-11-29 PROCEDURE — 36000704 HC OR TIME LEV I 1ST 15 MIN: Performed by: STUDENT IN AN ORGANIZED HEALTH CARE EDUCATION/TRAINING PROGRAM

## 2024-11-29 PROCEDURE — 64635 DESTROY LUMB/SAC FACET JNT: CPT

## 2024-11-29 RX ORDER — LIDOCAINE HYDROCHLORIDE 20 MG/ML
INJECTION, SOLUTION EPIDURAL; INFILTRATION; INTRACAUDAL; PERINEURAL
Status: DISCONTINUED | OUTPATIENT
Start: 2024-11-29 | End: 2024-11-29 | Stop reason: HOSPADM

## 2024-11-29 RX ORDER — BUPIVACAINE HYDROCHLORIDE 2.5 MG/ML
INJECTION, SOLUTION EPIDURAL; INFILTRATION; INTRACAUDAL
Status: DISCONTINUED | OUTPATIENT
Start: 2024-11-29 | End: 2024-11-29 | Stop reason: HOSPADM

## 2024-11-29 RX ORDER — DEXAMETHASONE SODIUM PHOSPHATE 10 MG/ML
INJECTION INTRAMUSCULAR; INTRAVENOUS
Status: DISCONTINUED | OUTPATIENT
Start: 2024-11-29 | End: 2024-11-29 | Stop reason: HOSPADM

## 2024-11-29 RX ORDER — LIDOCAINE HYDROCHLORIDE 10 MG/ML
1 INJECTION, SOLUTION EPIDURAL; INFILTRATION; INTRACAUDAL; PERINEURAL ONCE
Status: DISCONTINUED | OUTPATIENT
Start: 2024-11-29 | End: 2024-11-29 | Stop reason: HOSPADM

## 2024-11-29 RX ORDER — SODIUM CHLORIDE, SODIUM LACTATE, POTASSIUM CHLORIDE, CALCIUM CHLORIDE 600; 310; 30; 20 MG/100ML; MG/100ML; MG/100ML; MG/100ML
INJECTION, SOLUTION INTRAVENOUS CONTINUOUS
Status: DISCONTINUED | OUTPATIENT
Start: 2024-11-29 | End: 2024-11-29 | Stop reason: HOSPADM

## 2024-11-29 NOTE — OP NOTE
Patient: Cole Mendes                                                    MRN: [id number]  : 1985                                              Date of procedure: 2024      Pre Procedure Diagnosis: Low back pain, Lumbago, Lumbar spondylosis without myelopathy/ lumbrosacral region    Post Procedure Diagnosis: Same    Procedure: Bilateral Lumbar Medial Branch Nerve Rhizotomy for  L4-5 and L5-S1 joints under Fluoroscopy     Attending: Gaviota Hoffman MD    Local Anesthetic Injected: 1% Lidocaine 10 ml    Sedation Medications: See RN note    Estimated Blood Loss: None    Complication: None      Procedure:  After informed consent was obtained, patient was taken to the fluoroscopy suite and placed in a prone position.  The skin was prepped and draped in the usual sterile fashion using chlorhexidine.  Anatomical landmarks were identified with the aid of fluoroscopy in PA and Oblique views, and the skin and subcutaneous tissue were infiltrated with a total of 5mL of 1% Lidocaine via a 25-gauge 1.5inch needle at each of the intended entry sites.  Subsequently, three 22-gauge 100mm 10mm tip introducer needles were advanced with the aid of fluoroscopy such that their tips were located at the respective positions of the superior medial border of the transverse processes with the posterior elements at each level.  Needle placement was confirmed with the aid of fluoroscopy.  Motor stimulation up to 2 Volts at each level confirmed no motor nerve involvement.  Impedance was less than 800 ohms at each level.  1ml of 2% lidocaine was instilled prior to lesioning.  Ablation was performed per level utilizing radiofrequency generator 80°C for 90 seconds. Then 0.5mL of a mixture of 0.5mL of Dexamethasone 10mg/mL and 2mL of 0.25% Bupivacaine was injected at each level.  There were no complications or paresthesias.   Patient tolerated the procedure well and all needles were removed intact.    Patient was observed in recovery and  discharged home with written instruction under supervision in stable condition.

## 2024-11-29 NOTE — DISCHARGE SUMMARY
[facility]  Discharge Note  Short Stay    Procedure(s) (LRB):  Radiofrequency Ablation, Nerve, Spinal, Lumbar, Medial Branch, 1 Level l4/5 and l5/s1 (Bilateral)      OUTCOME: Patient tolerated treatment/procedure well without complication and is now ready for discharge.    DISPOSITION: Home or Self Care    FINAL DIAGNOSIS:  <principal problem not specified>    FOLLOWUP: In clinic    DISCHARGE INSTRUCTIONS:    Discharge Procedure Orders   Notify your health care provider if you experience any of the following:  temperature >100.4     Notify your health care provider if you experience any of the following:  severe uncontrolled pain     Notify your health care provider if you experience any of the following:  redness, tenderness, or signs of infection (pain, swelling, redness, odor or green/yellow discharge around incision site)     Activity as tolerated        TIME SPENT ON DISCHARGE: 20 minutes

## 2024-11-29 NOTE — DISCHARGE INSTRUCTIONS
Radiofrequency of Nerves     This procedure may take several weeks to relieve pain You may get some pain relief from the local anesthetic initally.     A steroid may also be injected to help with pain relief. Steroids can have side effect of flushed face and nervous feeling.     If Diabetic, monitor your glucose carefully due to steroids could raise blood sugar.     Wait until tomorrow to resume any blood thinners (aspirin, Plavix, Coumadin) but you may resume all your other medications today unless otherwise instructed by your MD.     You can resume your normal diet. If nauseated start with a bland diet and gradually increase to normal diet.     Due to having anesthesia do not drive, drink alcohol, or make legal decisions for 24 hours.     Do not lift over 10lbs for the first 24 hours, gradually increase your activities as tolerated.     No heat at the injection sites, including heating pads, hot tubs, saunas, swimming or tub baths for 2 full days.     Use ice pack for mild swelling and for comfort , apply for 20 minutes at a time.     You may shower today.     When to call your doctor   Call right away if you notice any of the following symptoms:   Severe pain or headache that is unrelieved with medication   Fever > 100.4 or chills   Severe redness or swelling around the injection site   Chest pain or Shortness of breath   Continuous Vomiting   Increasing numbness or weakness in arms or legs lasting greater than 8 hours   If you are diabetic, a steroid injection can increase your blood sugar so moniter it carefully.     After Surgery:   Always be aware that any surgery can cause these symptoms:   ?   Pain- Medication can be prescribed for pain to decrease your pain but may not completely take your pain away. Over the Counter pain medicine my be enough and you can always use Ice and rest to help ease pain.   ?   Bleeding- a little bleeding after a surgery is usually within normal. If there is a lot of blood you  need to notify your MD. Emergency treatments of bleeding are cold application, elevation of the bleeding site and compression.   ?   Infection- Infection after surgery is NOT a normal occurrence. Signs of infection are fever, swelling, hot to touch the incision. If this occurs notify your MD immediately.   ?   Nausea- this can be common after a surgery especially if you have had anesthesia medicine or are taking pain medicine. The steroid the Dr injected can have a side effect of Nausea. Staying on clear liquids, bland foods, gingerale, or over the counter anti nausea medicines can help. If you vomit more than once, notify your MD. Anti Nausea medicines can be prescribed.

## 2024-11-29 NOTE — PLAN OF CARE
Reviewed AVS with pt and wife. Verbalized understanding. Denies further questions , needs or complaints. Able to hover BLE above bed for 15 seconds. Ambulates well at bs.To exit via w/c. Home via private vehicle with wife driving and under her care.

## 2024-11-29 NOTE — PLAN OF CARE
Patient ready for surgery. Surgery and anesthesia consents signed. Belongings in room. Spouse set up with text message notifications.

## 2025-02-11 ENCOUNTER — OFFICE VISIT (OUTPATIENT)
Dept: PAIN MEDICINE | Facility: CLINIC | Age: 40
End: 2025-02-11
Payer: COMMERCIAL

## 2025-02-11 VITALS — BODY MASS INDEX: 29.96 KG/M2 | HEIGHT: 71 IN | WEIGHT: 214 LBS

## 2025-02-11 DIAGNOSIS — M47.817 SPONDYLOSIS OF LUMBOSACRAL REGION WITHOUT MYELOPATHY OR RADICULOPATHY: Primary | ICD-10-CM

## 2025-02-11 PROCEDURE — 3008F BODY MASS INDEX DOCD: CPT | Mod: CPTII,S$GLB,, | Performed by: STUDENT IN AN ORGANIZED HEALTH CARE EDUCATION/TRAINING PROGRAM

## 2025-02-11 PROCEDURE — 99213 OFFICE O/P EST LOW 20 MIN: CPT | Mod: S$GLB,,, | Performed by: STUDENT IN AN ORGANIZED HEALTH CARE EDUCATION/TRAINING PROGRAM

## 2025-02-11 PROCEDURE — 1160F RVW MEDS BY RX/DR IN RCRD: CPT | Mod: CPTII,S$GLB,, | Performed by: STUDENT IN AN ORGANIZED HEALTH CARE EDUCATION/TRAINING PROGRAM

## 2025-02-11 PROCEDURE — 1159F MED LIST DOCD IN RCRD: CPT | Mod: CPTII,S$GLB,, | Performed by: STUDENT IN AN ORGANIZED HEALTH CARE EDUCATION/TRAINING PROGRAM

## 2025-02-11 PROCEDURE — 99999 PR PBB SHADOW E&M-EST. PATIENT-LVL II: CPT | Mod: PBBFAC,,, | Performed by: STUDENT IN AN ORGANIZED HEALTH CARE EDUCATION/TRAINING PROGRAM

## 2025-02-11 NOTE — PROGRESS NOTES
[facility] - Department    Office Note    No, Primary Doctor      First Office Visit: 10/19/23  Today' Date: 2/11/2025  Last Office Visit: 11/12/24    Chief complaint: back pain    HPI: Pt is a pleasant 39 y.o., who presents for evaluation. Referred by self. Pt seen previously for back pain of years. Of note, pt has grade 1 anterolisthesis of L5 on S1 with movement with flexion and extension. Pt initially had RFA for axial lower back pain and had complete relief of sxs back in Feb. Pt seen today for f/u after repeat B RFA L4-5 and L5-S1. States it took longer this time but has had almost complete relief of his back pain. Continues to deny having sharp, shooting pain going down the legs. No numbness/tingling down the legs. No weakness or BB changes. Is doing HEP.     Pain score: 4  Pain disability score: 28      Relevant Imaging/ Testing:   XR L-spine flex/ex 12/23 - grade 1 anterolisthesis of L5 on S1 with 5 mm anterior subluxation during extension and 3 mm anterior subluxation during flexion   MR L-spine 12/23  XR L-spine 10/23    Procedures:   B RFA L4-5 and L5-S1 2/23/24, 11/29/24  B MBB L4-5 and L5-S1 12/11/23, 1/8/24    Date of board of pharmacy review:2/11/2025  Date of opioid risk screening/ pain psych: None  Date of opioid agreement and consent: None  Date of urine drug screen: None  Date of random pill count: None     was reviewed today: reviewed, no concerns     Prescribed medications: None    See EHR for  PMH, PSH, FH, SH, Medications and Allergy    ROS:  Positive for pain  ROS     PE:  There were no vitals filed for this visit.    General: Pleasant, no distress  HEENT: NC/ AT. PERRLA  CV: Radial pulses intact  Pulm: No distress  Ext: No edema    Physical Exam     Neuromusculoskeletal:  Head: NC, AT. PERRLA.  Neck: Intact range of motions  Shoulder: Intact range of motion  Lumbar: Intact range of motion. Pain with extension. Pos Facet loading bilaterally. Min Tenderness. Neg SL   Hip: Intact range of  motion  SI: Level, min tenderness  Knee: Intact range of motion  Reflexes: normal Knee  Strength: 5/5 globally   Sensory: Grossly intact   Skin: No bruising, erythema  Gait: Normal      Impression:  Back pain  Relevant History  None     Assessment:  Grade 1 anterolisthesis L5 on S1 with 5 mm anterior subluxation during extension and 3 mm anterior subluxation during flexion - pt has seen Dr. Umanzor   Axial back pain    Plan:  Discussed options  Imaging/ relevant records viewed/ reviewed/ discussed  Imaging results viewed and reviewed (noted above)/ reviewed with patient   reviewed  Cont HEP  B RFA L4-5 and L5-S1 prn axial back pain - almost complete relief of sxs, will continue to monitor  Consider ILESI L5-S1 if pain unresponsive to repeat RFA in which case pain is most likely from grade 1 spondy with dynamic movement   Back to Dr. Umanzor if needed        Prescribed medications:  1. None    The impression and plan were discussed and explained in detail. All the questions were answered. Education was provided accordingly.             Follow-up:  As needed    Gaviota Hoffman MD

## 2025-04-03 ENCOUNTER — OCCUPATIONAL HEALTH (OUTPATIENT)
Dept: URGENT CARE | Facility: CLINIC | Age: 40
End: 2025-04-03

## 2025-04-07 LAB — CHOLINESTERASE SERPL-CCNC: 2792 IU/L (ref 1801–3537)

## (undated) DEVICE — GLOVE SENSICARE PI ALOE 6.5

## (undated) DEVICE — NDL ECLIPSE SAF REG 25GX1.5IN

## (undated) DEVICE — STRAP OR TABLE 5IN X 72IN

## (undated) DEVICE — APPLICATOR CHLORAPREP ORN 26ML

## (undated) DEVICE — TOWEL OR DISP STRL BLUE 4/PK

## (undated) DEVICE — PAD ELECTROSURGICAL PAT PLATE

## (undated) DEVICE — NDL SPINAL 25GX3.5 SPINOCAN

## (undated) DEVICE — GLOVE SENSICARE PI ALOE 6

## (undated) DEVICE — CANNULA CVD 100MM X 20G

## (undated) DEVICE — SYS LABEL CORRECT MED

## (undated) DEVICE — SYR 10CC LUER LOCK

## (undated) DEVICE — NDL BLUNT W/O FILTER 18GX1.5IN

## (undated) DEVICE — COVER PROXIMA MAYO STAND

## (undated) DEVICE — SPONGE BULKEE II ABSRB 6X6.75

## (undated) DEVICE — PAD GROUNDING DISPER ELECTRODE

## (undated) DEVICE — DRAPE C ARM 42 X 120 10/BX

## (undated) DEVICE — CANNULA RADIOPAQUE 20G CURVED

## (undated) DEVICE — NDL FLTR 5MCRN BLNT TIP 18GX1

## (undated) DEVICE — SKIN MARKER STER DUAL TIP

## (undated) DEVICE — NDL HYPODERMIC BLUNT 18G 1.5IN

## (undated) DEVICE — SYR DISP LL 5CC

## (undated) DEVICE — DRAPE THREE-QTR REINF 53X77IN

## (undated) DEVICE — NDL SAFETY 25G X 1.5 ECLIPSE